# Patient Record
Sex: MALE | Race: BLACK OR AFRICAN AMERICAN | NOT HISPANIC OR LATINO | Employment: FULL TIME | URBAN - NONMETROPOLITAN AREA
[De-identification: names, ages, dates, MRNs, and addresses within clinical notes are randomized per-mention and may not be internally consistent; named-entity substitution may affect disease eponyms.]

---
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
## 2023-03-21 PROBLEM — N18.30 STAGE 3 CHRONIC KIDNEY DISEASE (MULTI): Status: ACTIVE | Noted: 2023-03-21

## 2023-03-21 PROBLEM — E66.811 CLASS 1 OBESITY WITH BODY MASS INDEX (BMI) OF 32.0 TO 32.9 IN ADULT: Status: ACTIVE | Noted: 2023-03-21

## 2023-03-21 PROBLEM — T14.8XXA MUSCLE STRAIN: Status: ACTIVE | Noted: 2023-03-21

## 2023-03-21 PROBLEM — E78.00 HYPERCHOLESTEROLEMIA: Status: ACTIVE | Noted: 2023-03-21

## 2023-03-21 PROBLEM — S22.49XA RIB FRACTURES: Status: ACTIVE | Noted: 2023-03-21

## 2023-03-21 PROBLEM — E87.6 HYPOKALEMIA: Status: ACTIVE | Noted: 2023-03-21

## 2023-03-21 PROBLEM — R73.9 HYPERGLYCEMIA: Status: ACTIVE | Noted: 2023-03-21

## 2023-03-21 PROBLEM — I10 BENIGN HYPERTENSION: Status: ACTIVE | Noted: 2023-03-21

## 2023-03-21 PROBLEM — J94.2 HEMOTHORAX: Status: ACTIVE | Noted: 2023-03-21

## 2023-03-21 PROBLEM — B02.9 SHINGLES: Status: ACTIVE | Noted: 2023-03-21

## 2023-03-21 PROBLEM — R07.81 RIB PAIN: Status: ACTIVE | Noted: 2023-03-21

## 2023-03-21 PROBLEM — R06.02 SHORTNESS OF BREATH: Status: ACTIVE | Noted: 2023-03-21

## 2023-03-21 PROBLEM — K21.9 ESOPHAGEAL REFLUX: Status: ACTIVE | Noted: 2023-03-21

## 2023-03-21 PROBLEM — L01.02 PUSTULAR FOLLICULITIS: Status: ACTIVE | Noted: 2023-03-21

## 2023-03-21 PROBLEM — M77.12 LATERAL EPICONDYLITIS OF LEFT ELBOW: Status: ACTIVE | Noted: 2023-03-21

## 2023-03-21 PROBLEM — E78.5 HYPERLIPIDEMIA: Status: ACTIVE | Noted: 2023-03-21

## 2023-03-21 PROBLEM — R94.31 ABNORMAL EKG: Status: ACTIVE | Noted: 2023-03-21

## 2023-03-21 PROBLEM — E66.9 CLASS 1 OBESITY WITH BODY MASS INDEX (BMI) OF 32.0 TO 32.9 IN ADULT: Status: ACTIVE | Noted: 2023-03-21

## 2023-03-21 RX ORDER — ROSUVASTATIN CALCIUM 10 MG/1
1 TABLET, COATED ORAL DAILY
COMMUNITY
Start: 2013-07-19 | End: 2023-03-31 | Stop reason: SDUPTHER

## 2023-03-21 RX ORDER — DILTIAZEM HYDROCHLORIDE 420 MG/1
1 CAPSULE, EXTENDED RELEASE ORAL DAILY
COMMUNITY
Start: 2016-07-01 | End: 2023-03-31 | Stop reason: SDUPTHER

## 2023-03-21 RX ORDER — POTASSIUM CHLORIDE 20 MEQ/1
1 TABLET, EXTENDED RELEASE ORAL 3 TIMES DAILY
COMMUNITY
Start: 2013-07-19 | End: 2023-03-31 | Stop reason: SDUPTHER

## 2023-03-21 RX ORDER — ESOMEPRAZOLE MAGNESIUM 40 MG/1
1 CAPSULE, DELAYED RELEASE ORAL DAILY
COMMUNITY
Start: 2019-02-14 | End: 2023-03-31 | Stop reason: SDUPTHER

## 2023-03-21 RX ORDER — TRIAMTERENE/HYDROCHLOROTHIAZID 37.5-25 MG
1 TABLET ORAL DAILY
COMMUNITY
Start: 2013-04-17 | End: 2023-03-31 | Stop reason: SDUPTHER

## 2023-03-21 RX ORDER — LOSARTAN POTASSIUM AND HYDROCHLOROTHIAZIDE 25; 100 MG/1; MG/1
1 TABLET ORAL DAILY
COMMUNITY
Start: 2013-07-19 | End: 2023-03-31 | Stop reason: SDUPTHER

## 2023-03-21 RX ORDER — CARVEDILOL 25 MG/1
25 TABLET ORAL
COMMUNITY
End: 2023-03-31 | Stop reason: SDUPTHER

## 2023-03-24 ENCOUNTER — OFFICE VISIT (OUTPATIENT)
Dept: PRIMARY CARE | Facility: CLINIC | Age: 58
End: 2023-03-24
Payer: COMMERCIAL

## 2023-03-24 VITALS
HEART RATE: 74 BPM | DIASTOLIC BLOOD PRESSURE: 80 MMHG | BODY MASS INDEX: 33 KG/M2 | OXYGEN SATURATION: 95 % | WEIGHT: 222.8 LBS | HEIGHT: 69 IN | RESPIRATION RATE: 18 BRPM | SYSTOLIC BLOOD PRESSURE: 130 MMHG

## 2023-03-24 DIAGNOSIS — E87.6 HYPOKALEMIA: ICD-10-CM

## 2023-03-24 DIAGNOSIS — K21.9 GASTROESOPHAGEAL REFLUX DISEASE, UNSPECIFIED WHETHER ESOPHAGITIS PRESENT: ICD-10-CM

## 2023-03-24 DIAGNOSIS — I10 BENIGN HYPERTENSION: ICD-10-CM

## 2023-03-24 DIAGNOSIS — E78.00 HYPERCHOLESTEROLEMIA: ICD-10-CM

## 2023-03-24 PROCEDURE — 3075F SYST BP GE 130 - 139MM HG: CPT | Performed by: INTERNAL MEDICINE

## 2023-03-24 PROCEDURE — 3079F DIAST BP 80-89 MM HG: CPT | Performed by: INTERNAL MEDICINE

## 2023-03-24 PROCEDURE — 99212 OFFICE O/P EST SF 10 MIN: CPT | Performed by: INTERNAL MEDICINE

## 2023-03-24 PROCEDURE — 1036F TOBACCO NON-USER: CPT | Performed by: INTERNAL MEDICINE

## 2023-03-24 ASSESSMENT — ENCOUNTER SYMPTOMS
CONSTITUTIONAL NEGATIVE: 1
HYPERTENSION: 1

## 2023-03-24 ASSESSMENT — PAIN SCALES - GENERAL: PAINLEVEL: 0-NO PAIN

## 2023-03-24 NOTE — PROGRESS NOTES
Subjective   Patient ID:   Bairon Quigley is a 57 y.o. male who presents to the office today for Follow-up, Hyperlipidemia, and Hypertension.    Hyperlipidemia  This is a chronic problem. The current episode started more than 1 year ago. The problem is controlled. Recent lipid tests were reviewed and are normal. Exacerbating diseases include chronic renal disease. Current antihyperlipidemic treatment includes statins. The current treatment provides significant improvement of lipids. Risk factors for coronary artery disease include hypertension.   Hypertension  This is a chronic problem. The current episode started more than 1 year ago. The problem has been gradually improving since onset. The problem is controlled. There are no associated agents to hypertension. Risk factors for coronary artery disease include stress and dyslipidemia. Past treatments include lifestyle changes, beta blockers, angiotensin blockers, diuretics and calcium channel blockers. The current treatment provides moderate improvement. There are no compliance problems.  Identifiable causes of hypertension include chronic renal disease.     Reports that he is feeling well. No issues with bowel or bladder. No chest pain, N/V/D/C/CP. Working out every day. No LANDAVERDE. Sleeping, eating and drinking ok.     Review of Systems   Constitutional: Negative.    All other systems reviewed and are negative.    12 point review of systems negative unless stated above in HPI    Vitals:    03/24/23 1326   BP: 130/80   Pulse: 74   Resp: 18   SpO2: 95%       Physical Exam  Vitals reviewed.   Constitutional:       General: He is not in acute distress.     Appearance: Normal appearance. He is not ill-appearing.   HENT:      Head: Normocephalic and atraumatic.      Right Ear: Tympanic membrane and external ear normal.      Left Ear: Tympanic membrane and external ear normal.      Nose: Nose normal.      Mouth/Throat:      Mouth: Mucous membranes are moist.      Pharynx:  Oropharynx is clear.   Eyes:      Conjunctiva/sclera: Conjunctivae normal.      Pupils: Pupils are equal, round, and reactive to light.   Cardiovascular:      Rate and Rhythm: Normal rate and regular rhythm.      Heart sounds: Normal heart sounds. No murmur heard.  Pulmonary:      Effort: Pulmonary effort is normal. No respiratory distress.      Breath sounds: Normal breath sounds. No wheezing.   Abdominal:      General: There is no distension.      Palpations: Abdomen is soft. There is no mass.      Tenderness: There is no abdominal tenderness.   Musculoskeletal:         General: Normal range of motion.      Cervical back: Normal range of motion and neck supple.   Skin:     General: Skin is warm and dry.   Neurological:      General: No focal deficit present.      Mental Status: He is alert and oriented to person, place, and time.      Sensory: No sensory deficit.      Motor: No weakness.      Coordination: Coordination normal.      Gait: Gait normal.   Psychiatric:         Mood and Affect: Mood normal.         Behavior: Behavior normal.       Current Outpatient Medications on File Prior to Visit   Medication Sig Dispense Refill    carvedilol (Coreg) 25 mg tablet Take 1 tablet (25 mg) by mouth in the morning and 1 tablet (25 mg) in the evening. Take with meals.      dilTIAZem ER (Tiazac) 420 mg 24 hr capsule Take 1 capsule (420 mg) by mouth once daily.      esomeprazole (NexIUM) 40 mg DR capsule Take 1 capsule (40 mg) by mouth once daily.      losartan-hydrochlorothiazide (Hyzaar) 100-25 mg tablet Take 1 tablet by mouth once daily.      potassium chloride CR (Klor-Con M20) 20 mEq ER tablet Take 1 tablet (20 mEq) by mouth in the morning and 1 tablet (20 mEq) in the evening and 1 tablet (20 mEq) before bedtime.      rosuvastatin (Crestor) 10 mg tablet Take 1 tablet (10 mg) by mouth once daily.      triamterene-hydrochlorothiazid (Maxzide-25) 37.5-25 mg tablet Take 1 tablet by mouth once daily.       No current  facility-administered medications on file prior to visit.      Assessment/Plan   Diagnoses and all orders for this visit:  Benign hypertension  Comments:  - c/w current medications  Hypokalemia  Comments:  - c/w kcL BID  - will recheck labs at next visit  Hypercholesterolemia  Comments:  - reviewed lipid panel from 7/2022  - recheck labs at next visit  - c/w statin  Gastroesophageal reflux disease, unspecified whether esophagitis present  Comments:  c/w PPI    ------  Written by Lisa Lloyd LPN, acting as a scribe for Dr. Henry. This note accurately reflects the work and decisions made by Dr. Henry.     I, Dr. Henry, attest all medical record entries made by the scribe were under my direction and were personally dictated by me. I have reviewed the chart and agree that the record accurately reflects my performance of the history, physical exam, and assessment and plan.

## 2023-03-31 DIAGNOSIS — K21.9 GASTROESOPHAGEAL REFLUX DISEASE WITHOUT ESOPHAGITIS: Primary | ICD-10-CM

## 2023-03-31 DIAGNOSIS — I10 BENIGN HYPERTENSION: ICD-10-CM

## 2023-03-31 DIAGNOSIS — E78.00 HYPERCHOLESTEROLEMIA: ICD-10-CM

## 2023-03-31 DIAGNOSIS — E87.6 HYPOKALEMIA: ICD-10-CM

## 2023-03-31 RX ORDER — ESOMEPRAZOLE MAGNESIUM 40 MG/1
40 CAPSULE, DELAYED RELEASE ORAL DAILY
Qty: 90 CAPSULE | Refills: 1 | Status: SHIPPED | OUTPATIENT
Start: 2023-03-31 | End: 2023-07-07 | Stop reason: SDUPTHER

## 2023-03-31 RX ORDER — CARVEDILOL 25 MG/1
25 TABLET ORAL
Qty: 180 TABLET | Refills: 1 | Status: SHIPPED | OUTPATIENT
Start: 2023-03-31 | End: 2023-07-07 | Stop reason: SDUPTHER

## 2023-03-31 RX ORDER — ROSUVASTATIN CALCIUM 10 MG/1
10 TABLET, COATED ORAL DAILY
Qty: 90 TABLET | Refills: 1 | Status: SHIPPED | OUTPATIENT
Start: 2023-03-31 | End: 2023-07-07 | Stop reason: SDUPTHER

## 2023-03-31 RX ORDER — POTASSIUM CHLORIDE 20 MEQ/1
20 TABLET, EXTENDED RELEASE ORAL 3 TIMES DAILY
Qty: 270 TABLET | Refills: 1 | Status: SHIPPED | OUTPATIENT
Start: 2023-03-31 | End: 2023-07-07 | Stop reason: SDUPTHER

## 2023-03-31 RX ORDER — DILTIAZEM HYDROCHLORIDE 420 MG/1
420 CAPSULE, EXTENDED RELEASE ORAL DAILY
Qty: 90 CAPSULE | Refills: 1 | Status: SHIPPED | OUTPATIENT
Start: 2023-03-31 | End: 2023-07-07 | Stop reason: SDUPTHER

## 2023-03-31 RX ORDER — TRIAMTERENE/HYDROCHLOROTHIAZID 37.5-25 MG
1 TABLET ORAL DAILY
Qty: 90 TABLET | Refills: 1 | Status: SHIPPED | OUTPATIENT
Start: 2023-03-31 | End: 2023-07-07 | Stop reason: SDUPTHER

## 2023-03-31 RX ORDER — LOSARTAN POTASSIUM AND HYDROCHLOROTHIAZIDE 25; 100 MG/1; MG/1
1 TABLET ORAL DAILY
Qty: 90 TABLET | Refills: 1 | Status: SHIPPED | OUTPATIENT
Start: 2023-03-31 | End: 2023-07-07 | Stop reason: SDUPTHER

## 2023-07-07 DIAGNOSIS — E78.00 HYPERCHOLESTEROLEMIA: ICD-10-CM

## 2023-07-07 DIAGNOSIS — I10 BENIGN HYPERTENSION: ICD-10-CM

## 2023-07-07 DIAGNOSIS — K21.9 GASTROESOPHAGEAL REFLUX DISEASE WITHOUT ESOPHAGITIS: ICD-10-CM

## 2023-07-07 DIAGNOSIS — E87.6 HYPOKALEMIA: ICD-10-CM

## 2023-07-10 RX ORDER — ROSUVASTATIN CALCIUM 10 MG/1
10 TABLET, COATED ORAL DAILY
Qty: 90 TABLET | Refills: 0 | Status: SHIPPED | OUTPATIENT
Start: 2023-07-10 | End: 2023-10-13 | Stop reason: SDUPTHER

## 2023-07-10 RX ORDER — ESOMEPRAZOLE MAGNESIUM 40 MG/1
40 CAPSULE, DELAYED RELEASE ORAL DAILY
Qty: 90 CAPSULE | Refills: 0 | Status: SHIPPED | OUTPATIENT
Start: 2023-07-10 | End: 2023-10-13 | Stop reason: SDUPTHER

## 2023-07-10 RX ORDER — POTASSIUM CHLORIDE 20 MEQ/1
20 TABLET, EXTENDED RELEASE ORAL 3 TIMES DAILY
Qty: 270 TABLET | Refills: 0 | Status: SHIPPED | OUTPATIENT
Start: 2023-07-10 | End: 2023-10-13 | Stop reason: SDUPTHER

## 2023-07-10 RX ORDER — LOSARTAN POTASSIUM AND HYDROCHLOROTHIAZIDE 25; 100 MG/1; MG/1
1 TABLET ORAL DAILY
Qty: 90 TABLET | Refills: 0 | Status: SHIPPED | OUTPATIENT
Start: 2023-07-10 | End: 2023-10-13 | Stop reason: SDUPTHER

## 2023-07-10 RX ORDER — TRIAMTERENE/HYDROCHLOROTHIAZID 37.5-25 MG
1 TABLET ORAL DAILY
Qty: 90 TABLET | Refills: 0 | Status: SHIPPED | OUTPATIENT
Start: 2023-07-10 | End: 2023-10-13 | Stop reason: SDUPTHER

## 2023-07-10 RX ORDER — DILTIAZEM HYDROCHLORIDE 420 MG/1
420 CAPSULE, EXTENDED RELEASE ORAL DAILY
Qty: 90 CAPSULE | Refills: 0 | Status: SHIPPED | OUTPATIENT
Start: 2023-07-10 | End: 2023-10-13 | Stop reason: SDUPTHER

## 2023-07-10 RX ORDER — CARVEDILOL 25 MG/1
25 TABLET ORAL
Qty: 180 TABLET | Refills: 0 | Status: SHIPPED | OUTPATIENT
Start: 2023-07-10 | End: 2023-10-13 | Stop reason: SDUPTHER

## 2023-09-06 ENCOUNTER — OFFICE VISIT (OUTPATIENT)
Dept: PRIMARY CARE | Facility: CLINIC | Age: 58
End: 2023-09-06
Payer: COMMERCIAL

## 2023-09-06 ENCOUNTER — LAB (OUTPATIENT)
Dept: LAB | Facility: LAB | Age: 58
End: 2023-09-06
Payer: COMMERCIAL

## 2023-09-06 VITALS
OXYGEN SATURATION: 94 % | HEART RATE: 77 BPM | SYSTOLIC BLOOD PRESSURE: 144 MMHG | HEIGHT: 69 IN | WEIGHT: 218.6 LBS | BODY MASS INDEX: 32.38 KG/M2 | RESPIRATION RATE: 18 BRPM | DIASTOLIC BLOOD PRESSURE: 97 MMHG

## 2023-09-06 DIAGNOSIS — I10 HYPERTENSION, UNSPECIFIED TYPE: ICD-10-CM

## 2023-09-06 DIAGNOSIS — E78.00 HYPERCHOLESTEROLEMIA: ICD-10-CM

## 2023-09-06 DIAGNOSIS — E87.6 HYPOKALEMIA: ICD-10-CM

## 2023-09-06 DIAGNOSIS — K21.9 GASTROESOPHAGEAL REFLUX DISEASE, UNSPECIFIED WHETHER ESOPHAGITIS PRESENT: ICD-10-CM

## 2023-09-06 DIAGNOSIS — E78.5 HYPERLIPIDEMIA, UNSPECIFIED HYPERLIPIDEMIA TYPE: ICD-10-CM

## 2023-09-06 LAB
ALANINE AMINOTRANSFERASE (SGPT) (U/L) IN SER/PLAS: 18 U/L (ref 10–52)
ALBUMIN (G/DL) IN SER/PLAS: 4.4 G/DL (ref 3.4–5)
ALKALINE PHOSPHATASE (U/L) IN SER/PLAS: 51 U/L (ref 33–120)
ANION GAP IN SER/PLAS: 9 MMOL/L (ref 10–20)
ASPARTATE AMINOTRANSFERASE (SGOT) (U/L) IN SER/PLAS: 28 U/L (ref 9–39)
BASOPHILS (10*3/UL) IN BLOOD BY AUTOMATED COUNT: 0.06 X10E9/L (ref 0–0.1)
BASOPHILS/100 LEUKOCYTES IN BLOOD BY AUTOMATED COUNT: 0.8 % (ref 0–2)
BILIRUBIN TOTAL (MG/DL) IN SER/PLAS: 0.7 MG/DL (ref 0–1.2)
CALCIUM (MG/DL) IN SER/PLAS: 9.1 MG/DL (ref 8.6–10.3)
CARBON DIOXIDE, TOTAL (MMOL/L) IN SER/PLAS: 28 MMOL/L (ref 21–32)
CHLORIDE (MMOL/L) IN SER/PLAS: 106 MMOL/L (ref 98–107)
CHOLESTEROL (MG/DL) IN SER/PLAS: 150 MG/DL (ref 0–199)
CHOLESTEROL IN HDL (MG/DL) IN SER/PLAS: 52.4 MG/DL
CHOLESTEROL/HDL RATIO: 2.9
CREATININE (MG/DL) IN SER/PLAS: 1.15 MG/DL (ref 0.5–1.3)
EOSINOPHILS (10*3/UL) IN BLOOD BY AUTOMATED COUNT: 0.75 X10E9/L (ref 0–0.7)
EOSINOPHILS/100 LEUKOCYTES IN BLOOD BY AUTOMATED COUNT: 9.6 % (ref 0–6)
ERYTHROCYTE DISTRIBUTION WIDTH (RATIO) BY AUTOMATED COUNT: 14 % (ref 11.5–14.5)
ERYTHROCYTE MEAN CORPUSCULAR HEMOGLOBIN CONCENTRATION (G/DL) BY AUTOMATED: 34.4 G/DL (ref 32–36)
ERYTHROCYTE MEAN CORPUSCULAR VOLUME (FL) BY AUTOMATED COUNT: 88 FL (ref 80–100)
ERYTHROCYTES (10*6/UL) IN BLOOD BY AUTOMATED COUNT: 5.2 X10E12/L (ref 4.5–5.9)
GFR MALE: 74 ML/MIN/1.73M2
GLUCOSE (MG/DL) IN SER/PLAS: 95 MG/DL (ref 74–99)
HEMATOCRIT (%) IN BLOOD BY AUTOMATED COUNT: 45.9 % (ref 41–52)
HEMOGLOBIN (G/DL) IN BLOOD: 15.8 G/DL (ref 13.5–17.5)
IMMATURE GRANULOCYTES/100 LEUKOCYTES IN BLOOD BY AUTOMATED COUNT: 0.3 % (ref 0–0.9)
LDL: 69 MG/DL (ref 0–99)
LEUKOCYTES (10*3/UL) IN BLOOD BY AUTOMATED COUNT: 7.8 X10E9/L (ref 4.4–11.3)
LYMPHOCYTES (10*3/UL) IN BLOOD BY AUTOMATED COUNT: 1.83 X10E9/L (ref 1.2–4.8)
LYMPHOCYTES/100 LEUKOCYTES IN BLOOD BY AUTOMATED COUNT: 23.5 % (ref 13–44)
MONOCYTES (10*3/UL) IN BLOOD BY AUTOMATED COUNT: 0.62 X10E9/L (ref 0.1–1)
MONOCYTES/100 LEUKOCYTES IN BLOOD BY AUTOMATED COUNT: 7.9 % (ref 2–10)
NEUTROPHILS (10*3/UL) IN BLOOD BY AUTOMATED COUNT: 4.52 X10E9/L (ref 1.2–7.7)
NEUTROPHILS/100 LEUKOCYTES IN BLOOD BY AUTOMATED COUNT: 57.9 % (ref 40–80)
PLATELETS (10*3/UL) IN BLOOD AUTOMATED COUNT: 297 X10E9/L (ref 150–450)
POTASSIUM (MMOL/L) IN SER/PLAS: 3.2 MMOL/L (ref 3.5–5.3)
PROTEIN TOTAL: 7 G/DL (ref 6.4–8.2)
SODIUM (MMOL/L) IN SER/PLAS: 140 MMOL/L (ref 136–145)
THYROXINE (T4) FREE (NG/DL) IN SER/PLAS: 0.64 NG/DL (ref 0.61–1.12)
TRIGLYCERIDE (MG/DL) IN SER/PLAS: 141 MG/DL (ref 0–149)
UREA NITROGEN (MG/DL) IN SER/PLAS: 13 MG/DL (ref 6–23)
VLDL: 28 MG/DL (ref 0–40)

## 2023-09-06 PROCEDURE — 3077F SYST BP >= 140 MM HG: CPT | Performed by: INTERNAL MEDICINE

## 2023-09-06 PROCEDURE — 1036F TOBACCO NON-USER: CPT | Performed by: INTERNAL MEDICINE

## 2023-09-06 PROCEDURE — 36415 COLL VENOUS BLD VENIPUNCTURE: CPT

## 2023-09-06 PROCEDURE — 84443 ASSAY THYROID STIM HORMONE: CPT

## 2023-09-06 PROCEDURE — 85025 COMPLETE CBC W/AUTO DIFF WBC: CPT

## 2023-09-06 PROCEDURE — 84439 ASSAY OF FREE THYROXINE: CPT

## 2023-09-06 PROCEDURE — 3080F DIAST BP >= 90 MM HG: CPT | Performed by: INTERNAL MEDICINE

## 2023-09-06 PROCEDURE — 80053 COMPREHEN METABOLIC PANEL: CPT

## 2023-09-06 PROCEDURE — 99213 OFFICE O/P EST LOW 20 MIN: CPT | Performed by: INTERNAL MEDICINE

## 2023-09-06 PROCEDURE — 84153 ASSAY OF PSA TOTAL: CPT

## 2023-09-06 PROCEDURE — 80061 LIPID PANEL: CPT

## 2023-09-06 RX ORDER — TERAZOSIN 1 MG/1
1 CAPSULE ORAL 2 TIMES DAILY
Qty: 60 CAPSULE | Refills: 5 | Status: SHIPPED | OUTPATIENT
Start: 2023-09-06 | End: 2023-10-13 | Stop reason: SDUPTHER

## 2023-09-06 ASSESSMENT — PATIENT HEALTH QUESTIONNAIRE - PHQ9
1. LITTLE INTEREST OR PLEASURE IN DOING THINGS: NOT AT ALL
SUM OF ALL RESPONSES TO PHQ9 QUESTIONS 1 AND 2: 0
2. FEELING DOWN, DEPRESSED OR HOPELESS: NOT AT ALL

## 2023-09-06 ASSESSMENT — PAIN SCALES - GENERAL: PAINLEVEL: 0-NO PAIN

## 2023-09-06 NOTE — PROGRESS NOTES
He states he feels really good, no new issues or complaints. He denies any constipation, diarrhea or issues with urination. He states that he is very active. He states he uses push mower to mow several lawns. He denies any chest pain or SOB. He denies any lower extremity edema. He denies any dizziness or lightheadedness. He states he only wakes up once at night to urinate.     Patient ID: Bairon Quigley is a 58 y.o. male with PMH remarkable for HLD, HTN who presents to the office today for follow up. He was last seen in the office on 3/24/23 for check up.     HEALTH MAINTENANCE: Follow Up  Smoking: never smoker  Labs: 7/7/22, WNL  PSA: DUE  Colonoscopy (45-75): 7/19/18  Lung cancer screening (55-80 + 30 pack year + smoking/quit in last 15 years): n/a    SOCIAL HISTORY:  Social History     Tobacco Use    Smoking status: Never    Smokeless tobacco: Former   Substance Use Topics    Alcohol use: Never    Drug use: Never     IMMUNIZATIONS:  Immunization History   Administered Date(s) Administered    Pfizer Purple Cap SARS-CoV-2 03/20/2021, 04/10/2021, 01/03/2022     REVIEW OF SYSTEMS:  Review of Systems   Constitutional: Negative.    Respiratory: Negative.     Cardiovascular: Negative.    Gastrointestinal: Negative.    Genitourinary: Negative.    Musculoskeletal: Negative.    Neurological: Negative.      ALLERGIES:  Allergies   Allergen Reactions    Influenza Virus Vaccine Whole Unknown      VITAL SIGNS:  Vitals:    09/06/23 1054   BP: (!) 144/97   Pulse: 77   Resp: 18   SpO2: 94%     PHYSICAL EXAM:  Physical Exam  Vitals reviewed.   Constitutional:       Appearance: Normal appearance.   HENT:      Head: Normocephalic and atraumatic.   Eyes:      Extraocular Movements: Extraocular movements intact.      Pupils: Pupils are equal, round, and reactive to light.   Cardiovascular:      Rate and Rhythm: Normal rate and regular rhythm.      Pulses: Normal pulses.      Heart sounds: Normal heart sounds.   Pulmonary:       Effort: Pulmonary effort is normal.      Breath sounds: Normal breath sounds.   Abdominal:      General: Bowel sounds are normal.      Palpations: Abdomen is soft.   Musculoskeletal:         General: Normal range of motion.      Cervical back: Normal range of motion and neck supple.   Skin:     General: Skin is warm and dry.   Neurological:      General: No focal deficit present.      Mental Status: He is alert and oriented to person, place, and time.   Psychiatric:         Mood and Affect: Mood normal.         Behavior: Behavior normal.       MEDICATIONS:  Current Outpatient Medications on File Prior to Visit   Medication Sig Dispense Refill    carvedilol (Coreg) 25 mg tablet Take 1 tablet (25 mg) by mouth 2 times a day with meals. 180 tablet 0    dilTIAZem ER (Tiazac) 420 mg 24 hr capsule Take 1 capsule (420 mg) by mouth once daily. 90 capsule 0    esomeprazole (NexIUM) 40 mg DR capsule Take 1 capsule (40 mg) by mouth once daily. 90 capsule 0    losartan-hydrochlorothiazide (Hyzaar) 100-25 mg tablet Take 1 tablet by mouth once daily. 90 tablet 0    potassium chloride CR (Klor-Con M20) 20 mEq ER tablet Take 1 tablet (20 mEq) by mouth 3 times a day. 270 tablet 0    rosuvastatin (Crestor) 10 mg tablet Take 1 tablet (10 mg) by mouth once daily. 90 tablet 0    triamterene-hydrochlorothiazid (Maxzide-25) 37.5-25 mg tablet Take 1 tablet by mouth once daily. 90 tablet 0     No current facility-administered medications on file prior to visit.      LABORATORY DATA:  Lab Results   Component Value Date    WBC 9.0 07/07/2022    HGB 15.9 07/07/2022    HCT 47.5 07/07/2022     07/07/2022    CHOL 168 07/07/2022    TRIG 129 07/07/2022    HDL 61.0 07/07/2022    ALT 26 07/07/2022    AST 32 07/07/2022     07/07/2022    K 3.4 (L) 07/07/2022     07/07/2022    CREATININE 1.22 07/07/2022    BUN 16 07/07/2022    CO2 26 07/07/2022    TSH 1.20 07/07/2022    INR 1.1 11/19/2021    HGBA1C 5.5 06/12/2020     ASSESSMENT AND  PLAN:  Health Maintenance: Four month check up  - he is due for annual fasting bloodwork- orders inputted  - he states he has been feeling good, has been getting plenty of exercise pushing a lawnmower  - he states he received call from Dr. Monreal's office to schedule colonoscopy, advised to schedule this  - Follow up in four months    HTN  - his BP is elevated today, 144/97  - he reports it is consistently elevated at home as well  - continue with carvedilol, losartan-hydrochlorothiazide, triamterene-hydrochlorothiazide, Diltiazem  - we will add terazosin 1mg twice daily  - advised to monitor BP at home 2-3 times per week and call us with results after 3 weeks    Hypokalemia  - continue with potassium supplement  - will check CMP    Hypercholesteremia  - continue with rosuvastatin  - will check lipid panel    GERD  - continue with PPI  --------------------  Written by Lilo Lyon LPN, acting as a scribe for Dr. Henry. This note accurately reflects the work and decisions made by Dr. Henry.     I, Dr. Henyr, attest all medical record entries made by the scribe were under my direction and were personally dictated by me. I have reviewed the chart and agree that the record accurately reflects my performance of the history, physical exam, and assessment and plan.

## 2023-09-07 LAB
PROSTATE SPECIFIC ANTIGEN,SCREEN: 0.74 NG/ML (ref 0–4)
THYROTROPIN (MIU/L) IN SER/PLAS BY DETECTION LIMIT <= 0.05 MIU/L: 0.87 MIU/L (ref 0.44–3.98)

## 2023-09-07 ASSESSMENT — ENCOUNTER SYMPTOMS
MUSCULOSKELETAL NEGATIVE: 1
GASTROINTESTINAL NEGATIVE: 1
NEUROLOGICAL NEGATIVE: 1
RESPIRATORY NEGATIVE: 1
CONSTITUTIONAL NEGATIVE: 1
CARDIOVASCULAR NEGATIVE: 1

## 2023-09-07 NOTE — PATIENT INSTRUCTIONS
It was great to see you in the office today! Here is what we discussed at your visit today:  Please continue to take your current medications   We will add another medication for your blood pressure called Terazosin, take as directed  Monitor your BP at home 2-3 times per week and call us with results after 3 weeks  Please get bloodwork drawn as soon as you are able. We will call you with results.  Follow up in four months

## 2023-09-08 DIAGNOSIS — I10 HYPERTENSION, UNSPECIFIED TYPE: ICD-10-CM

## 2023-10-13 DIAGNOSIS — E78.00 HYPERCHOLESTEROLEMIA: ICD-10-CM

## 2023-10-13 DIAGNOSIS — I10 BENIGN HYPERTENSION: ICD-10-CM

## 2023-10-13 DIAGNOSIS — I10 HYPERTENSION, UNSPECIFIED TYPE: ICD-10-CM

## 2023-10-13 DIAGNOSIS — K21.9 GASTROESOPHAGEAL REFLUX DISEASE WITHOUT ESOPHAGITIS: ICD-10-CM

## 2023-10-13 DIAGNOSIS — E87.6 HYPOKALEMIA: ICD-10-CM

## 2023-10-17 RX ORDER — ESOMEPRAZOLE MAGNESIUM 40 MG/1
40 CAPSULE, DELAYED RELEASE ORAL DAILY
Qty: 90 CAPSULE | Refills: 0 | Status: SHIPPED | OUTPATIENT
Start: 2023-10-17 | End: 2024-01-29

## 2023-10-17 RX ORDER — TERAZOSIN 1 MG/1
1 CAPSULE ORAL 2 TIMES DAILY
Qty: 180 CAPSULE | Refills: 0 | Status: SHIPPED | OUTPATIENT
Start: 2023-10-17 | End: 2024-02-16 | Stop reason: ALTCHOICE

## 2023-10-17 RX ORDER — POTASSIUM CHLORIDE 20 MEQ/1
20 TABLET, EXTENDED RELEASE ORAL 3 TIMES DAILY
Qty: 270 TABLET | Refills: 0 | Status: SHIPPED | OUTPATIENT
Start: 2023-10-17 | End: 2024-01-29

## 2023-10-17 RX ORDER — TRIAMTERENE/HYDROCHLOROTHIAZID 37.5-25 MG
1 TABLET ORAL DAILY
Qty: 90 TABLET | Refills: 0 | Status: SHIPPED | OUTPATIENT
Start: 2023-10-17 | End: 2024-01-29

## 2023-10-17 RX ORDER — ROSUVASTATIN CALCIUM 10 MG/1
10 TABLET, COATED ORAL DAILY
Qty: 90 TABLET | Refills: 0 | Status: SHIPPED | OUTPATIENT
Start: 2023-10-17 | End: 2024-01-29

## 2023-10-17 RX ORDER — CARVEDILOL 25 MG/1
25 TABLET ORAL
Qty: 180 TABLET | Refills: 0 | Status: SHIPPED | OUTPATIENT
Start: 2023-10-17 | End: 2024-01-29

## 2023-10-17 RX ORDER — LOSARTAN POTASSIUM AND HYDROCHLOROTHIAZIDE 25; 100 MG/1; MG/1
1 TABLET ORAL DAILY
Qty: 90 TABLET | Refills: 0 | Status: SHIPPED | OUTPATIENT
Start: 2023-10-17 | End: 2024-01-29

## 2023-10-17 RX ORDER — DILTIAZEM HYDROCHLORIDE 420 MG/1
420 CAPSULE, EXTENDED RELEASE ORAL DAILY
Qty: 90 CAPSULE | Refills: 0 | Status: SHIPPED | OUTPATIENT
Start: 2023-10-17 | End: 2024-05-13

## 2023-12-01 ENCOUNTER — ANCILLARY PROCEDURE (OUTPATIENT)
Dept: RADIOLOGY | Facility: CLINIC | Age: 58
End: 2023-12-01
Payer: COMMERCIAL

## 2023-12-01 DIAGNOSIS — S69.90XA FINGER INJURY, INITIAL ENCOUNTER: ICD-10-CM

## 2023-12-01 PROCEDURE — 73140 X-RAY EXAM OF FINGER(S): CPT | Mod: LT,FY

## 2023-12-01 PROCEDURE — 73140 X-RAY EXAM OF FINGER(S): CPT | Mod: LEFT SIDE | Performed by: RADIOLOGY

## 2024-02-16 ENCOUNTER — OFFICE VISIT (OUTPATIENT)
Dept: PRIMARY CARE | Facility: CLINIC | Age: 59
End: 2024-02-16
Payer: COMMERCIAL

## 2024-02-16 VITALS
SYSTOLIC BLOOD PRESSURE: 142 MMHG | HEART RATE: 75 BPM | DIASTOLIC BLOOD PRESSURE: 96 MMHG | BODY MASS INDEX: 32.14 KG/M2 | HEIGHT: 69 IN | WEIGHT: 217 LBS | RESPIRATION RATE: 18 BRPM | OXYGEN SATURATION: 96 %

## 2024-02-16 DIAGNOSIS — I10 BENIGN HYPERTENSION: ICD-10-CM

## 2024-02-16 DIAGNOSIS — E78.5 HYPERLIPIDEMIA, UNSPECIFIED HYPERLIPIDEMIA TYPE: ICD-10-CM

## 2024-02-16 DIAGNOSIS — Z00.00 HEALTHCARE MAINTENANCE: ICD-10-CM

## 2024-02-16 DIAGNOSIS — I15.8 OTHER SECONDARY HYPERTENSION: ICD-10-CM

## 2024-02-16 DIAGNOSIS — N18.30 STAGE 3 CHRONIC KIDNEY DISEASE, UNSPECIFIED WHETHER STAGE 3A OR 3B CKD (MULTI): ICD-10-CM

## 2024-02-16 PROCEDURE — 1036F TOBACCO NON-USER: CPT | Performed by: INTERNAL MEDICINE

## 2024-02-16 PROCEDURE — 3077F SYST BP >= 140 MM HG: CPT | Performed by: INTERNAL MEDICINE

## 2024-02-16 PROCEDURE — 3080F DIAST BP >= 90 MM HG: CPT | Performed by: INTERNAL MEDICINE

## 2024-02-16 PROCEDURE — 99213 OFFICE O/P EST LOW 20 MIN: CPT | Performed by: INTERNAL MEDICINE

## 2024-02-16 RX ORDER — DILTIAZEM HYDROCHLORIDE 120 MG/1
120 CAPSULE, EXTENDED RELEASE ORAL DAILY
Qty: 90 CAPSULE | Refills: 1 | Status: SHIPPED | OUTPATIENT
Start: 2024-02-16 | End: 2024-05-21 | Stop reason: SDUPTHER

## 2024-02-16 RX ORDER — DILTIAZEM HYDROCHLORIDE 120 MG/1
120 CAPSULE, COATED, EXTENDED RELEASE ORAL DAILY
Qty: 14 CAPSULE | Refills: 0 | Status: SHIPPED | OUTPATIENT
Start: 2024-02-16 | End: 2024-03-01

## 2024-02-16 ASSESSMENT — PATIENT HEALTH QUESTIONNAIRE - PHQ9
2. FEELING DOWN, DEPRESSED OR HOPELESS: NOT AT ALL
SUM OF ALL RESPONSES TO PHQ9 QUESTIONS 1 AND 2: 0
1. LITTLE INTEREST OR PLEASURE IN DOING THINGS: NOT AT ALL

## 2024-02-16 ASSESSMENT — PAIN SCALES - GENERAL: PAINLEVEL: 0-NO PAIN

## 2024-02-16 NOTE — PROGRESS NOTES
Patient ID:   Bairon Quigley is a 58 y.o. male with PMH remarkable for HTN, HLD who presents to the office today for Follow-up (No new concerns).    HEALTH MAINTENANCE: Follow Up  Smoking: never smoker  Labs: 9/6/2023  PSA: 0.74 on 9.6.2023  Colonoscopy (45-75): 7/19/18  Lung cancer screening (55-80 + 30 pack year + smoking/quit in last 15 years): 1/11/2022 and showed stable 5mm RLL nodules    SOCIAL HISTORY:  Social History     Tobacco Use    Smoking status: Never    Smokeless tobacco: Former   Substance Use Topics    Alcohol use: Never    Drug use: Never       IMMUNIZATIONS:  Immunization History   Administered Date(s) Administered    Pfizer Purple Cap SARS-CoV-2 03/20/2021, 04/10/2021, 01/03/2022       Social History     Tobacco Use    Smoking status: Never    Smokeless tobacco: Former   Substance Use Topics    Alcohol use: Never        Review of Systems   All other systems reviewed and are negative.      VITAL SIGNS:  Vitals:    02/16/24 1217   BP: (!) 142/96   Pulse: 75   Resp: 18   SpO2: 96%       ALLERGIES:  Allergies   Allergen Reactions    Influenza Virus Vaccine Whole Unknown        Physical Exam  Vitals reviewed.   Constitutional:       General: He is not in acute distress.     Appearance: Normal appearance. He is not ill-appearing.   HENT:      Head: Normocephalic and atraumatic.      Right Ear: Tympanic membrane and external ear normal.      Left Ear: Tympanic membrane and external ear normal.      Nose: Nose normal.      Mouth/Throat:      Mouth: Mucous membranes are moist.      Pharynx: Oropharynx is clear.   Eyes:      Conjunctiva/sclera: Conjunctivae normal.      Pupils: Pupils are equal, round, and reactive to light.   Cardiovascular:      Rate and Rhythm: Normal rate and regular rhythm.      Heart sounds: Normal heart sounds. No murmur heard.  Pulmonary:      Effort: Pulmonary effort is normal. No respiratory distress.      Breath sounds: Normal breath sounds. No wheezing.   Abdominal:       General: There is no distension.      Palpations: Abdomen is soft. There is no mass.      Tenderness: There is no abdominal tenderness.   Musculoskeletal:         General: Normal range of motion.      Cervical back: Normal range of motion and neck supple.   Skin:     General: Skin is warm and dry.   Neurological:      General: No focal deficit present.      Mental Status: He is alert and oriented to person, place, and time.      Sensory: No sensory deficit.      Motor: No weakness.      Coordination: Coordination normal.      Gait: Gait normal.   Psychiatric:         Mood and Affect: Mood normal.         Behavior: Behavior normal.         MEDICATIONS:  Current Outpatient Medications   Medication Instructions    carvedilol (COREG) 25 mg, oral, 2 times daily with meals    dilTIAZem CD (CARDIZEM CD) 120 mg, oral, Daily, To hold you over until your mail order script is delivered    dilTIAZem ER (TIAZAC) 420 mg, oral, Daily    dilTIAZem ER (TIAZAC) 120 mg, oral, Daily, Ok to take with 420mg daily to equal max dose of 540mg daily    esomeprazole (NEXIUM) 40 mg, oral, Daily    losartan-hydrochlorothiazide (Hyzaar) 100-25 mg tablet 1 tablet, oral, Daily    potassium chloride CR 20 mEq ER tablet 20 mEq, oral, 3 times daily    rosuvastatin (CRESTOR) 10 mg, oral, Daily    triamterene-hydrochlorothiazid (Maxzide-25) 37.5-25 mg tablet 1 tablet, oral, Daily        RECENT LABS:  Lab Results   Component Value Date    WBC 7.8 09/06/2023    HGB 15.8 09/06/2023    HCT 45.9 09/06/2023     09/06/2023    CHOL 150 09/06/2023    TRIG 141 09/06/2023    HDL 52.4 09/06/2023    ALT 18 09/06/2023    AST 28 09/06/2023     09/06/2023    K 3.2 (L) 09/06/2023     09/06/2023    CREATININE 1.15 09/06/2023    BUN 13 09/06/2023    CO2 28 09/06/2023    TSH 0.87 09/06/2023    INR 1.1 11/19/2021    HGBA1C 5.5 06/12/2020       ASSESSMENT AND PLAN:  Assessment/Plan   Bairon was seen today for follow-up.  Diagnoses and all orders for this  visit:  Healthcare maintenance  -     Full code  Benign hypertension  Comments:  - see below  - sent 2wks worth of 120mg diltiazem to local pharm and 3m worth to the pharmacy  Orders:  -     dilTIAZem ER (Tiazac) 120 mg 24 hr capsule; Take 1 capsule (120 mg) by mouth once daily. Ok to take with 420mg daily to equal max dose of 540mg daily  -     dilTIAZem CD (Cardizem CD) 120 mg 24 hr capsule; Take 1 capsule (120 mg) by mouth once daily for 14 days. To hold you over until your mail order script is delivered  Stage 3 chronic kidney disease, unspecified whether stage 3a or 3b CKD (CMS/[facility])  Comments:  - renally dose medications  - reviewed last kidney function  Hyperlipidemia, unspecified hyperlipidemia type  Other secondary hypertension  Comments:  - c/w coreg BID, losartan-HCTZ, Maxzide-25 QD  - CHANGE diltiazem to 420mg + 120mg = 540mg daily  - monitor BP at home       --------------------  Written by Lisa Lloyd RN, acting as a scribe for Dr. Henry. This note accurately reflects the work and decisions made by Dr. Henry.     I, Dr. Henry, attest all medical record entries made by the scribe were under my direction and were personally dictated by me. I have reviewed the chart and agree that the record accurately reflects my performance of the history, physical exam, and assessment and plan.

## 2024-02-19 PROBLEM — S22.49XA RIB FRACTURES: Status: RESOLVED | Noted: 2023-03-21 | Resolved: 2024-02-19

## 2024-02-19 PROBLEM — R07.81 RIB PAIN: Status: RESOLVED | Noted: 2023-03-21 | Resolved: 2024-02-19

## 2024-02-19 PROBLEM — T14.8XXA MUSCLE STRAIN: Status: RESOLVED | Noted: 2023-03-21 | Resolved: 2024-02-19

## 2024-02-19 PROBLEM — R94.31 ABNORMAL EKG: Status: RESOLVED | Noted: 2023-03-21 | Resolved: 2024-02-19

## 2024-02-19 PROBLEM — L01.02 PUSTULAR FOLLICULITIS: Status: RESOLVED | Noted: 2023-03-21 | Resolved: 2024-02-19
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
## 2024-02-19 PROBLEM — M77.12 LATERAL EPICONDYLITIS OF LEFT ELBOW: Status: RESOLVED | Noted: 2023-03-21 | Resolved: 2024-02-19

## 2024-02-19 PROBLEM — E78.00 HYPERCHOLESTEROLEMIA: Status: RESOLVED | Noted: 2023-03-21 | Resolved: 2024-02-19

## 2024-05-12 DIAGNOSIS — I10 BENIGN HYPERTENSION: ICD-10-CM

## 2024-05-12 DIAGNOSIS — K21.9 GASTROESOPHAGEAL REFLUX DISEASE WITHOUT ESOPHAGITIS: ICD-10-CM

## 2024-05-12 DIAGNOSIS — E78.00 HYPERCHOLESTEROLEMIA: ICD-10-CM

## 2024-05-12 DIAGNOSIS — E87.6 HYPOKALEMIA: ICD-10-CM

## 2024-05-13 RX ORDER — TRIAMTERENE/HYDROCHLOROTHIAZID 37.5-25 MG
1 TABLET ORAL DAILY
Qty: 90 TABLET | Refills: 0 | Status: SHIPPED | OUTPATIENT
Start: 2024-05-13 | End: 2024-05-21 | Stop reason: SDUPTHER

## 2024-05-13 RX ORDER — POTASSIUM CHLORIDE 20 MEQ/1
20 TABLET, EXTENDED RELEASE ORAL 3 TIMES DAILY
Qty: 270 TABLET | Refills: 0 | Status: SHIPPED | OUTPATIENT
Start: 2024-05-13 | End: 2024-05-21 | Stop reason: SDUPTHER

## 2024-05-13 RX ORDER — DILTIAZEM HYDROCHLORIDE 420 MG/1
420 CAPSULE, EXTENDED RELEASE ORAL DAILY
Qty: 90 CAPSULE | Refills: 0 | Status: SHIPPED | OUTPATIENT
Start: 2024-05-13 | End: 2024-05-21 | Stop reason: SDUPTHER

## 2024-05-13 RX ORDER — CARVEDILOL 25 MG/1
25 TABLET ORAL
Qty: 180 TABLET | Refills: 0 | Status: SHIPPED | OUTPATIENT
Start: 2024-05-13 | End: 2024-05-21 | Stop reason: SDUPTHER

## 2024-05-13 RX ORDER — LOSARTAN POTASSIUM AND HYDROCHLOROTHIAZIDE 25; 100 MG/1; MG/1
1 TABLET ORAL DAILY
Qty: 90 TABLET | Refills: 0 | Status: SHIPPED | OUTPATIENT
Start: 2024-05-13 | End: 2024-05-21 | Stop reason: SDUPTHER

## 2024-05-13 RX ORDER — ESOMEPRAZOLE MAGNESIUM 40 MG/1
40 CAPSULE, DELAYED RELEASE ORAL DAILY
Qty: 90 CAPSULE | Refills: 0 | Status: SHIPPED | OUTPATIENT
Start: 2024-05-13 | End: 2024-05-21 | Stop reason: SDUPTHER

## 2024-05-13 RX ORDER — ROSUVASTATIN CALCIUM 10 MG/1
10 TABLET, COATED ORAL DAILY
Qty: 90 TABLET | Refills: 0 | Status: SHIPPED | OUTPATIENT
Start: 2024-05-13 | End: 2024-05-21 | Stop reason: SDUPTHER

## 2024-05-21 DIAGNOSIS — K21.9 GASTROESOPHAGEAL REFLUX DISEASE WITHOUT ESOPHAGITIS: ICD-10-CM

## 2024-05-21 DIAGNOSIS — E78.00 HYPERCHOLESTEROLEMIA: ICD-10-CM

## 2024-05-21 DIAGNOSIS — I10 BENIGN HYPERTENSION: ICD-10-CM

## 2024-05-21 DIAGNOSIS — E87.6 HYPOKALEMIA: ICD-10-CM

## 2024-05-21 RX ORDER — CARVEDILOL 25 MG/1
25 TABLET ORAL
Qty: 180 TABLET | Refills: 0 | Status: SHIPPED | OUTPATIENT
Start: 2024-05-21

## 2024-05-21 RX ORDER — POTASSIUM CHLORIDE 20 MEQ/1
20 TABLET, EXTENDED RELEASE ORAL 3 TIMES DAILY
Qty: 270 TABLET | Refills: 0 | Status: SHIPPED | OUTPATIENT
Start: 2024-05-21

## 2024-05-21 RX ORDER — DILTIAZEM HYDROCHLORIDE 120 MG/1
120 CAPSULE, EXTENDED RELEASE ORAL DAILY
Qty: 90 CAPSULE | Refills: 1 | Status: SHIPPED | OUTPATIENT
Start: 2024-05-21

## 2024-05-21 RX ORDER — ESOMEPRAZOLE MAGNESIUM 40 MG/1
40 CAPSULE, DELAYED RELEASE ORAL DAILY
Qty: 90 CAPSULE | Refills: 0 | Status: SHIPPED | OUTPATIENT
Start: 2024-05-21

## 2024-05-21 RX ORDER — ROSUVASTATIN CALCIUM 10 MG/1
10 TABLET, COATED ORAL DAILY
Qty: 90 TABLET | Refills: 0 | Status: SHIPPED | OUTPATIENT
Start: 2024-05-21

## 2024-05-21 RX ORDER — DILTIAZEM HYDROCHLORIDE 420 MG/1
420 CAPSULE, EXTENDED RELEASE ORAL DAILY
Qty: 90 CAPSULE | Refills: 0 | Status: SHIPPED | OUTPATIENT
Start: 2024-05-21

## 2024-05-21 RX ORDER — LOSARTAN POTASSIUM AND HYDROCHLOROTHIAZIDE 25; 100 MG/1; MG/1
1 TABLET ORAL DAILY
Qty: 90 TABLET | Refills: 0 | Status: SHIPPED | OUTPATIENT
Start: 2024-05-21

## 2024-05-21 RX ORDER — TRIAMTERENE/HYDROCHLOROTHIAZID 37.5-25 MG
1 TABLET ORAL DAILY
Qty: 90 TABLET | Refills: 0 | Status: SHIPPED | OUTPATIENT
Start: 2024-05-21

## 2024-07-10 ENCOUNTER — APPOINTMENT (OUTPATIENT)
Dept: PRIMARY CARE | Facility: CLINIC | Age: 59
End: 2024-07-10
Payer: COMMERCIAL

## 2024-07-10 VITALS
BODY MASS INDEX: 32.11 KG/M2 | HEIGHT: 69 IN | OXYGEN SATURATION: 96 % | RESPIRATION RATE: 18 BRPM | DIASTOLIC BLOOD PRESSURE: 83 MMHG | WEIGHT: 216.8 LBS | SYSTOLIC BLOOD PRESSURE: 123 MMHG | HEART RATE: 57 BPM

## 2024-07-10 DIAGNOSIS — E78.5 HYPERLIPIDEMIA, UNSPECIFIED HYPERLIPIDEMIA TYPE: ICD-10-CM

## 2024-07-10 DIAGNOSIS — I15.8 OTHER SECONDARY HYPERTENSION: ICD-10-CM

## 2024-07-10 DIAGNOSIS — E87.6 HYPOKALEMIA: ICD-10-CM

## 2024-07-10 DIAGNOSIS — K21.9 GASTROESOPHAGEAL REFLUX DISEASE, UNSPECIFIED WHETHER ESOPHAGITIS PRESENT: ICD-10-CM

## 2024-07-10 PROCEDURE — 3074F SYST BP LT 130 MM HG: CPT | Performed by: INTERNAL MEDICINE

## 2024-07-10 PROCEDURE — 99213 OFFICE O/P EST LOW 20 MIN: CPT | Performed by: INTERNAL MEDICINE

## 2024-07-10 PROCEDURE — 1036F TOBACCO NON-USER: CPT | Performed by: INTERNAL MEDICINE

## 2024-07-10 PROCEDURE — 3079F DIAST BP 80-89 MM HG: CPT | Performed by: INTERNAL MEDICINE

## 2024-07-10 ASSESSMENT — PAIN SCALES - GENERAL: PAINLEVEL: 0-NO PAIN

## 2024-07-10 ASSESSMENT — PATIENT HEALTH QUESTIONNAIRE - PHQ9
SUM OF ALL RESPONSES TO PHQ9 QUESTIONS 1 AND 2: 0
1. LITTLE INTEREST OR PLEASURE IN DOING THINGS: NOT AT ALL
2. FEELING DOWN, DEPRESSED OR HOPELESS: NOT AT ALL

## 2024-07-10 NOTE — PROGRESS NOTES
"Patient ID: He states his Bp has been good. Denies any chest pain, SOB or cough. Denies any depression. He denies any swelling in his lower extremities. He states he has been working a lot. He denies any issues with recent allergies. He states he has been taking his Potassium supplement.     HEALTH MAINTENANCE: Follow Up  Last Office Visit: 2/16/24  Last Labs: 9/6/23  PSA Screening (starting at age 55 till 69): 9/6/23  Colonoscopy (45-75 or age 40 with 1st degree relative dx colon ca): 7/19/18  Lung cancer screening (50-78 y/o + 20 pack year + smoking/quit in last 15 years): 1/11/2022 and showed stable 5mm RLL nodules     HPI Bairon Quigley is a 59 y.o. male with PMH remarkable for  HTN, HLD  who presents to the office today for Follow-up (5 mth ).    Social History     Tobacco Use    Smoking status: Never    Smokeless tobacco: Former   Substance Use Topics    Alcohol use: Never    Drug use: Never       Review of Systems   Constitutional: Negative.    Respiratory: Negative.     Cardiovascular: Negative.    Gastrointestinal: Negative.    Genitourinary: Negative.    Musculoskeletal: Negative.    Neurological: Negative.    Psychiatric/Behavioral: Negative.         Visit Vitals  /83   Pulse 57   Resp 18   Ht 1.753 m (5' 9\")   Wt 98.3 kg (216 lb 12.8 oz)   SpO2 96%   BMI 32.02 kg/m²   Smoking Status Never   BSA 2.19 m²       Physical Exam  Vitals reviewed.   Constitutional:       Appearance: Normal appearance.   HENT:      Head: Normocephalic and atraumatic.   Cardiovascular:      Rate and Rhythm: Normal rate and regular rhythm.      Pulses: Normal pulses.      Heart sounds: Normal heart sounds.   Pulmonary:      Effort: Pulmonary effort is normal.      Breath sounds: Wheezing present.   Abdominal:      General: Bowel sounds are normal.      Palpations: Abdomen is soft.   Musculoskeletal:         General: Normal range of motion.   Skin:     General: Skin is warm and dry.   Neurological:      General: No focal " deficit present.      Mental Status: He is alert and oriented to person, place, and time.   Psychiatric:         Mood and Affect: Mood normal.         Behavior: Behavior normal.         Current Outpatient Medications   Medication Instructions    carvedilol (COREG) 25 mg, oral, 2 times daily (morning and late afternoon)    dilTIAZem CD (CARDIZEM CD) 120 mg, oral, Daily, To hold you over until your mail order script is delivered    dilTIAZem ER (TIAZAC) 420 mg, oral, Daily    dilTIAZem ER (TIAZAC) 120 mg, oral, Daily, Ok to take with 420mg daily to equal max dose of 540mg daily    esomeprazole (NEXIUM) 40 mg, oral, Daily    losartan-hydrochlorothiazide (Hyzaar) 100-25 mg tablet 1 tablet, oral, Daily    potassium chloride CR 20 mEq ER tablet 20 mEq, oral, 3 times daily    rosuvastatin (CRESTOR) 10 mg, oral, Daily    triamterene-hydrochlorothiazid (Maxzide-25) 37.5-25 mg tablet 1 tablet, oral, Daily        Lab Results   Component Value Date    WBC 7.8 09/06/2023    HGB 15.8 09/06/2023    HCT 45.9 09/06/2023     09/06/2023    CHOL 150 09/06/2023    TRIG 141 09/06/2023    HDL 52.4 09/06/2023    ALT 18 09/06/2023    AST 28 09/06/2023     09/06/2023    K 3.2 (L) 09/06/2023     09/06/2023    CREATININE 1.15 09/06/2023    BUN 13 09/06/2023    CO2 28 09/06/2023    TSH 0.87 09/06/2023    INR 1.1 11/19/2021    HGBA1C 5.5 06/12/2020       Problem List Items Addressed This Visit             ICD-10-CM    Hypertension I10     BP is good today, 128/83  Continue with Diltiazem, BP better controlled since increasing dose  Continue with Maxzide and Coreg  Continue with potassium supplement         Esophageal reflux K21.9     Stable on PPI         Hyperlipidemia E78.5     C/w statin         Hypokalemia E87.6     He states he has been taking his K+ supplement  Advised patient to have labwork drawn that was previously ordered to check potassium level as it was previously low            --------------------  Written by  Krista Lyon LPN, acting as a scribe for Dr. Henry. This note accurately reflects the work and decisions made by Dr. Henry.     I, Dr. Henry, attest all medical record entries made by the scribe were under my direction and were personally dictated by me. I have reviewed the chart and agree that the record accurately reflects my performance of the history, physical exam, and assessment and plan.

## 2024-07-12 ASSESSMENT — ENCOUNTER SYMPTOMS
RESPIRATORY NEGATIVE: 1
PSYCHIATRIC NEGATIVE: 1
CARDIOVASCULAR NEGATIVE: 1
GASTROINTESTINAL NEGATIVE: 1
NEUROLOGICAL NEGATIVE: 1
MUSCULOSKELETAL NEGATIVE: 1
CONSTITUTIONAL NEGATIVE: 1

## 2024-07-13 NOTE — PATIENT INSTRUCTIONS
It was great to see you in the office today! Here is what we discussed at your visit today:  Please continue to take your current medications   Please get bloodwork drawn as soon as you are able. We will call you with results.

## 2024-07-13 NOTE — ASSESSMENT & PLAN NOTE
BP is good today, 128/83  Continue with Diltiazem, BP better controlled since increasing dose  Continue with Maxzide and Coreg  Continue with potassium supplement

## 2024-07-13 NOTE — ASSESSMENT & PLAN NOTE
He states he has been taking his K+ supplement  Advised patient to have labwork drawn that was previously ordered to check potassium level as it was previously low

## 2024-09-03 DIAGNOSIS — K21.9 GASTROESOPHAGEAL REFLUX DISEASE WITHOUT ESOPHAGITIS: ICD-10-CM

## 2024-09-03 DIAGNOSIS — E78.00 HYPERCHOLESTEROLEMIA: ICD-10-CM

## 2024-09-03 DIAGNOSIS — I10 BENIGN HYPERTENSION: ICD-10-CM

## 2024-09-03 DIAGNOSIS — E87.6 HYPOKALEMIA: ICD-10-CM

## 2024-09-04 RX ORDER — ESOMEPRAZOLE MAGNESIUM 40 MG/1
40 CAPSULE, DELAYED RELEASE ORAL DAILY
Qty: 90 CAPSULE | Refills: 0 | Status: SHIPPED | OUTPATIENT
Start: 2024-09-04

## 2024-09-04 RX ORDER — POTASSIUM CHLORIDE 20 MEQ/1
20 TABLET, EXTENDED RELEASE ORAL 3 TIMES DAILY
Qty: 270 TABLET | Refills: 0 | Status: SHIPPED | OUTPATIENT
Start: 2024-09-04

## 2024-09-04 RX ORDER — LOSARTAN POTASSIUM AND HYDROCHLOROTHIAZIDE 25; 100 MG/1; MG/1
1 TABLET ORAL DAILY
Qty: 90 TABLET | Refills: 0 | Status: SHIPPED | OUTPATIENT
Start: 2024-09-04

## 2024-09-04 RX ORDER — CARVEDILOL 25 MG/1
25 TABLET ORAL
Qty: 180 TABLET | Refills: 0 | Status: SHIPPED | OUTPATIENT
Start: 2024-09-04

## 2024-09-04 RX ORDER — ROSUVASTATIN CALCIUM 10 MG/1
10 TABLET, COATED ORAL DAILY
Qty: 90 TABLET | Refills: 0 | Status: SHIPPED | OUTPATIENT
Start: 2024-09-04

## 2024-09-04 RX ORDER — TRIAMTERENE/HYDROCHLOROTHIAZID 37.5-25 MG
1 TABLET ORAL DAILY
Qty: 90 TABLET | Refills: 0 | Status: SHIPPED | OUTPATIENT
Start: 2024-09-04

## 2024-09-04 RX ORDER — DILTIAZEM HYDROCHLORIDE 420 MG/1
420 CAPSULE, EXTENDED RELEASE ORAL DAILY
Qty: 90 CAPSULE | Refills: 0 | Status: SHIPPED | OUTPATIENT
Start: 2024-09-04

## 2024-09-04 RX ORDER — DILTIAZEM HYDROCHLORIDE 120 MG/1
120 CAPSULE, EXTENDED RELEASE ORAL DAILY
Qty: 90 CAPSULE | Refills: 1 | Status: SHIPPED | OUTPATIENT
Start: 2024-09-04

## 2024-12-11 ENCOUNTER — APPOINTMENT (OUTPATIENT)
Dept: PRIMARY CARE | Facility: CLINIC | Age: 59
End: 2024-12-11
Payer: COMMERCIAL

## 2024-12-11 VITALS
HEIGHT: 69 IN | WEIGHT: 226.8 LBS | SYSTOLIC BLOOD PRESSURE: 130 MMHG | DIASTOLIC BLOOD PRESSURE: 87 MMHG | BODY MASS INDEX: 33.59 KG/M2 | OXYGEN SATURATION: 98 % | RESPIRATION RATE: 18 BRPM | HEART RATE: 86 BPM

## 2024-12-11 DIAGNOSIS — E87.6 HYPOKALEMIA: ICD-10-CM

## 2024-12-11 DIAGNOSIS — I15.8 OTHER SECONDARY HYPERTENSION: ICD-10-CM

## 2024-12-11 DIAGNOSIS — R79.89 LOW VITAMIN B12 LEVEL: ICD-10-CM

## 2024-12-11 DIAGNOSIS — R79.89 LOW VITAMIN D LEVEL: ICD-10-CM

## 2024-12-11 DIAGNOSIS — E78.5 HYPERLIPIDEMIA, UNSPECIFIED HYPERLIPIDEMIA TYPE: ICD-10-CM

## 2024-12-11 DIAGNOSIS — N18.30 STAGE 3 CHRONIC KIDNEY DISEASE, UNSPECIFIED WHETHER STAGE 3A OR 3B CKD (MULTI): ICD-10-CM

## 2024-12-11 DIAGNOSIS — K21.9 GASTROESOPHAGEAL REFLUX DISEASE, UNSPECIFIED WHETHER ESOPHAGITIS PRESENT: ICD-10-CM

## 2024-12-11 PROCEDURE — 3079F DIAST BP 80-89 MM HG: CPT | Performed by: INTERNAL MEDICINE

## 2024-12-11 PROCEDURE — 99214 OFFICE O/P EST MOD 30 MIN: CPT | Performed by: INTERNAL MEDICINE

## 2024-12-11 PROCEDURE — 3008F BODY MASS INDEX DOCD: CPT | Performed by: INTERNAL MEDICINE

## 2024-12-11 PROCEDURE — 3075F SYST BP GE 130 - 139MM HG: CPT | Performed by: INTERNAL MEDICINE

## 2024-12-11 PROCEDURE — 1036F TOBACCO NON-USER: CPT | Performed by: INTERNAL MEDICINE

## 2024-12-11 RX ORDER — LOSARTAN POTASSIUM 100 MG/1
100 TABLET ORAL DAILY
Qty: 90 TABLET | Refills: 1 | Status: SHIPPED | OUTPATIENT
Start: 2024-12-11 | End: 2025-12-11

## 2024-12-11 ASSESSMENT — PAIN SCALES - GENERAL: PAINLEVEL_OUTOF10: 0-NO PAIN

## 2024-12-11 NOTE — PROGRESS NOTES
"Patient ID: He states he is doing ok. He denies any CP, cough or SOB. Denies any issues with bowels. Denies any issues with urination. He states he has been on Triamterene-hydrochlorothiazide and Losartan-hydrochlorothiazide for quite awhile.     HEALTH MAINTENANCE: Follow Up  Last Office Visit: 7/10/24  Last Labs: 9/6/23  PSA Screening (starting at age 55 till 69): 9/6/23 wnl  Colonoscopy (45-75 or age 40 with 1st degree relative dx colon ca): 7/19/2018  Lung cancer screening (50-76 y/o x 20 pk yr for at least 20 yrs + current smoker OR quit in last 15 years, no CT w/I last year):     ANY Quigley is a 59 y.o. male with PMH remarkable for HTN, HLD  who presents to the office today for follow up.     Social History     Tobacco Use    Smoking status: Never    Smokeless tobacco: Former   Substance Use Topics    Alcohol use: Never    Drug use: Never     Review of Systems   Constitutional: Negative.    HENT: Negative.     Respiratory: Negative.     Cardiovascular: Negative.    Gastrointestinal: Negative.    Genitourinary: Negative.    Musculoskeletal: Negative.    Skin: Negative.    Neurological: Negative.    Psychiatric/Behavioral: Negative.       Visit Vitals  Vitals:    12/11/24 1402   BP: 130/87   Pulse: 86   Resp: 18   SpO2: 98%   Weight: 103 kg (226 lb 12.8 oz)   Height: 1.753 m (5' 9\")      Smoking Status Never     Physical Exam  Vitals reviewed.   Constitutional:       Appearance: Normal appearance.   HENT:      Head: Normocephalic and atraumatic.   Cardiovascular:      Rate and Rhythm: Normal rate and regular rhythm.      Pulses: Normal pulses.      Heart sounds: Normal heart sounds.   Pulmonary:      Effort: Pulmonary effort is normal.      Breath sounds: Normal breath sounds.   Abdominal:      General: Bowel sounds are normal.      Palpations: Abdomen is soft.   Musculoskeletal:         General: Normal range of motion.   Skin:     General: Skin is warm and dry.   Neurological:      General: No focal " deficit present.      Mental Status: He is alert and oriented to person, place, and time.   Psychiatric:         Mood and Affect: Mood normal.         Behavior: Behavior normal.       Current Outpatient Medications   Medication Instructions    carvedilol (COREG) 25 mg, oral, 2 times daily (morning and late afternoon)    dilTIAZem ER (TIAZAC) 120 mg, oral, Daily, Ok to take with 420mg daily to equal max dose of 540mg daily    dilTIAZem ER (TIAZAC) 420 mg, oral, Daily    esomeprazole (NEXIUM) 40 mg, oral, Daily    losartan-hydrochlorothiazide (Hyzaar) 100-25 mg tablet 1 tablet, oral, Daily    potassium chloride CR 20 mEq ER tablet 20 mEq, oral, 3 times daily    rosuvastatin (CRESTOR) 10 mg, oral, Daily    triamterene-hydrochlorothiazid (Maxzide-25) 37.5-25 mg tablet 1 tablet, oral, Daily      Lab Results   Component Value Date    WBC 7.8 09/06/2023    HGB 15.8 09/06/2023    HCT 45.9 09/06/2023     09/06/2023    CHOL 150 09/06/2023    TRIG 141 09/06/2023    HDL 52.4 09/06/2023    ALT 18 09/06/2023    AST 28 09/06/2023     09/06/2023    K 3.2 (L) 09/06/2023     09/06/2023    CREATININE 1.15 09/06/2023    BUN 13 09/06/2023    CO2 28 09/06/2023    TSH 0.87 09/06/2023    INR 1.1 11/19/2021    HGBA1C 5.5 06/12/2020     Problem List Items Addressed This Visit             ICD-10-CM    Hypertension I10     Continue with Diltiazem, Carvedilol and triamterene-hydrochlorothiazide   Will stop losartan-hydrochlorothiazide and change to Losartan 100mg daily, advised ok finish his supply at home(has about two weeks left) before starting Losartan by itself  Advised to get BMP two weeks after starting Losartan without hydrochlorothiazide, will adjust Potassium dose accordingly         Relevant Medications    losartan (Cozaar) 100 mg tablet    Other Relevant Orders    Tsh With Reflex To Free T4 If Abnormal    Esophageal reflux K21.9     Continue with PPI         Relevant Orders    CBC and Auto Differential     Hyperlipidemia E78.5     Continue with statin         Relevant Orders    Lipid panel    Hypokalemia E87.6    Relevant Orders    Comprehensive metabolic panel    Stage 3 chronic kidney disease (Multi) N18.30    Relevant Orders    Comprehensive metabolic panel     Other Visit Diagnoses         Codes    Low vitamin D level     R79.89    Relevant Orders    Vitamin D 25-Hydroxy,Total (for eval of Vitamin D levels)    Low vitamin B12 level     R79.89    Relevant Orders    Vitamin B12        --------------------  Written by Krista Lyon RN, acting as a scribe for Dr. Henry. This note accurately reflects the work and decisions made by Dr. Henry.     I, Dr. Henry, attest all medical record entries made by the scribe were under my direction and were personally dictated by me. I have reviewed the chart and agree that the record accurately reflects my performance of the history, physical exam, and assessment and plan.

## 2024-12-11 NOTE — PATIENT INSTRUCTIONS
It was great to see you in the office today! Here is what we discussed at your visit today:    After you finish current supply of medications, change Hyzaar as discussed to Losartan only, new prescription sent in    Approximately 2 weeks after starting on Losartan by itself , get bloodwork drawn, it is fasting bloodwork  We will call you with results    Follow up in four months

## 2024-12-12 ASSESSMENT — ENCOUNTER SYMPTOMS
GASTROINTESTINAL NEGATIVE: 1
CONSTITUTIONAL NEGATIVE: 1
MUSCULOSKELETAL NEGATIVE: 1
PSYCHIATRIC NEGATIVE: 1
CARDIOVASCULAR NEGATIVE: 1
NEUROLOGICAL NEGATIVE: 1
RESPIRATORY NEGATIVE: 1

## 2024-12-12 NOTE — ASSESSMENT & PLAN NOTE
Continue with Diltiazem, Carvedilol and triamterene-hydrochlorothiazide   Will stop losartan-hydrochlorothiazide and change to Losartan 100mg daily, advised ok finish his supply at home(has about two weeks left) before starting Losartan by itself  Advised to get BMP two weeks after starting Losartan without hydrochlorothiazide, will adjust Potassium dose accordingly

## 2024-12-20 DIAGNOSIS — K21.9 GASTROESOPHAGEAL REFLUX DISEASE WITHOUT ESOPHAGITIS: ICD-10-CM

## 2024-12-20 DIAGNOSIS — I10 BENIGN HYPERTENSION: ICD-10-CM

## 2024-12-20 DIAGNOSIS — E87.6 HYPOKALEMIA: ICD-10-CM

## 2024-12-20 DIAGNOSIS — I15.8 OTHER SECONDARY HYPERTENSION: ICD-10-CM

## 2024-12-20 DIAGNOSIS — E78.00 HYPERCHOLESTEROLEMIA: ICD-10-CM

## 2024-12-20 RX ORDER — TRIAMTERENE/HYDROCHLOROTHIAZID 37.5-25 MG
1 TABLET ORAL DAILY
Qty: 90 TABLET | Refills: 0 | Status: SHIPPED | OUTPATIENT
Start: 2024-12-20

## 2024-12-20 RX ORDER — POTASSIUM CHLORIDE 20 MEQ/1
20 TABLET, EXTENDED RELEASE ORAL 3 TIMES DAILY
Qty: 270 TABLET | Refills: 0 | Status: SHIPPED | OUTPATIENT
Start: 2024-12-20

## 2024-12-20 RX ORDER — CARVEDILOL 25 MG/1
25 TABLET ORAL
Qty: 180 TABLET | Refills: 0 | Status: SHIPPED | OUTPATIENT
Start: 2024-12-20

## 2024-12-20 RX ORDER — ROSUVASTATIN CALCIUM 10 MG/1
10 TABLET, COATED ORAL DAILY
Qty: 90 TABLET | Refills: 0 | Status: SHIPPED | OUTPATIENT
Start: 2024-12-20

## 2024-12-20 RX ORDER — DILTIAZEM HYDROCHLORIDE 120 MG/1
120 CAPSULE, EXTENDED RELEASE ORAL DAILY
Qty: 90 CAPSULE | Refills: 0 | Status: SHIPPED | OUTPATIENT
Start: 2024-12-20

## 2024-12-20 RX ORDER — LOSARTAN POTASSIUM 100 MG/1
100 TABLET ORAL DAILY
Qty: 90 TABLET | Refills: 0 | Status: SHIPPED | OUTPATIENT
Start: 2024-12-20 | End: 2025-12-20

## 2024-12-20 RX ORDER — DILTIAZEM HYDROCHLORIDE 420 MG/1
420 CAPSULE, EXTENDED RELEASE ORAL DAILY
Qty: 90 CAPSULE | Refills: 0 | Status: SHIPPED | OUTPATIENT
Start: 2024-12-20

## 2024-12-20 RX ORDER — ESOMEPRAZOLE MAGNESIUM 40 MG/1
40 CAPSULE, DELAYED RELEASE ORAL DAILY
Qty: 90 CAPSULE | Refills: 0 | Status: SHIPPED | OUTPATIENT
Start: 2024-12-20

## 2025-01-17 ENCOUNTER — LAB (OUTPATIENT)
Dept: LAB | Facility: LAB | Age: 60
End: 2025-01-17
Payer: COMMERCIAL

## 2025-01-17 DIAGNOSIS — N18.30 STAGE 3 CHRONIC KIDNEY DISEASE, UNSPECIFIED WHETHER STAGE 3A OR 3B CKD (MULTI): ICD-10-CM

## 2025-01-17 DIAGNOSIS — E87.6 HYPOKALEMIA: ICD-10-CM

## 2025-01-17 DIAGNOSIS — K21.9 GASTROESOPHAGEAL REFLUX DISEASE, UNSPECIFIED WHETHER ESOPHAGITIS PRESENT: ICD-10-CM

## 2025-01-17 DIAGNOSIS — E78.5 HYPERLIPIDEMIA, UNSPECIFIED HYPERLIPIDEMIA TYPE: ICD-10-CM

## 2025-01-17 DIAGNOSIS — I15.8 OTHER SECONDARY HYPERTENSION: ICD-10-CM

## 2025-01-17 DIAGNOSIS — R79.89 LOW VITAMIN D LEVEL: ICD-10-CM

## 2025-01-17 DIAGNOSIS — R79.89 LOW VITAMIN B12 LEVEL: ICD-10-CM

## 2025-01-17 LAB
25(OH)D3 SERPL-MCNC: 28 NG/ML (ref 30–100)
ALBUMIN SERPL BCP-MCNC: 4.7 G/DL (ref 3.4–5)
ALP SERPL-CCNC: 55 U/L (ref 33–120)
ALT SERPL W P-5'-P-CCNC: 24 U/L (ref 10–52)
ANION GAP SERPL CALC-SCNC: 14 MMOL/L (ref 10–20)
AST SERPL W P-5'-P-CCNC: 28 U/L (ref 9–39)
BASOPHILS # BLD AUTO: 0.04 X10*3/UL (ref 0–0.1)
BASOPHILS NFR BLD AUTO: 0.5 %
BILIRUB SERPL-MCNC: 0.5 MG/DL (ref 0–1.2)
BUN SERPL-MCNC: 15 MG/DL (ref 6–23)
CALCIUM SERPL-MCNC: 9.8 MG/DL (ref 8.6–10.3)
CHLORIDE SERPL-SCNC: 109 MMOL/L (ref 98–107)
CHOLEST SERPL-MCNC: 164 MG/DL (ref 0–199)
CHOLESTEROL/HDL RATIO: 3
CO2 SERPL-SCNC: 28 MMOL/L (ref 21–32)
CREAT SERPL-MCNC: 1.26 MG/DL (ref 0.5–1.3)
EGFRCR SERPLBLD CKD-EPI 2021: 66 ML/MIN/1.73M*2
EOSINOPHIL # BLD AUTO: 0.84 X10*3/UL (ref 0–0.7)
EOSINOPHIL NFR BLD AUTO: 10.7 %
ERYTHROCYTE [DISTWIDTH] IN BLOOD BY AUTOMATED COUNT: 14 % (ref 11.5–14.5)
GLUCOSE SERPL-MCNC: 90 MG/DL (ref 74–99)
HCT VFR BLD AUTO: 45.2 % (ref 41–52)
HDLC SERPL-MCNC: 54.7 MG/DL
HGB BLD-MCNC: 15.3 G/DL (ref 13.5–17.5)
IMM GRANULOCYTES # BLD AUTO: 0.02 X10*3/UL (ref 0–0.7)
IMM GRANULOCYTES NFR BLD AUTO: 0.3 % (ref 0–0.9)
LDLC SERPL CALC-MCNC: 80 MG/DL
LYMPHOCYTES # BLD AUTO: 1.58 X10*3/UL (ref 1.2–4.8)
LYMPHOCYTES NFR BLD AUTO: 20.1 %
MCH RBC QN AUTO: 30 PG (ref 26–34)
MCHC RBC AUTO-ENTMCNC: 33.8 G/DL (ref 32–36)
MCV RBC AUTO: 89 FL (ref 80–100)
MONOCYTES # BLD AUTO: 0.59 X10*3/UL (ref 0.1–1)
MONOCYTES NFR BLD AUTO: 7.5 %
NEUTROPHILS # BLD AUTO: 4.81 X10*3/UL (ref 1.2–7.7)
NEUTROPHILS NFR BLD AUTO: 60.9 %
NON HDL CHOLESTEROL: 109 MG/DL (ref 0–149)
NRBC BLD-RTO: 0 /100 WBCS (ref 0–0)
PLATELET # BLD AUTO: 292 X10*3/UL (ref 150–450)
POTASSIUM SERPL-SCNC: 3.9 MMOL/L (ref 3.5–5.3)
PROT SERPL-MCNC: 7 G/DL (ref 6.4–8.2)
RBC # BLD AUTO: 5.1 X10*6/UL (ref 4.5–5.9)
SODIUM SERPL-SCNC: 147 MMOL/L (ref 136–145)
TRIGL SERPL-MCNC: 149 MG/DL (ref 0–149)
TSH SERPL-ACNC: 0.66 MIU/L (ref 0.44–3.98)
VIT B12 SERPL-MCNC: 329 PG/ML (ref 211–911)
VLDL: 30 MG/DL (ref 0–40)
WBC # BLD AUTO: 7.9 X10*3/UL (ref 4.4–11.3)

## 2025-01-17 PROCEDURE — 82607 VITAMIN B-12: CPT

## 2025-01-17 PROCEDURE — 82306 VITAMIN D 25 HYDROXY: CPT

## 2025-01-27 ENCOUNTER — OFFICE VISIT (OUTPATIENT)
Dept: URGENT CARE | Age: 60
End: 2025-01-27
Payer: COMMERCIAL

## 2025-01-27 VITALS
SYSTOLIC BLOOD PRESSURE: 150 MMHG | HEART RATE: 68 BPM | OXYGEN SATURATION: 97 % | DIASTOLIC BLOOD PRESSURE: 88 MMHG | RESPIRATION RATE: 18 BRPM | TEMPERATURE: 98 F

## 2025-01-27 DIAGNOSIS — J10.1 INFLUENZA A: Primary | ICD-10-CM

## 2025-01-27 DIAGNOSIS — J40 BRONCHITIS: ICD-10-CM

## 2025-01-27 DIAGNOSIS — R52 BODY ACHES: ICD-10-CM

## 2025-01-27 DIAGNOSIS — R05.1 ACUTE COUGH: ICD-10-CM

## 2025-01-27 DIAGNOSIS — R06.2 WHEEZING: ICD-10-CM

## 2025-01-27 DIAGNOSIS — R50.9 FEVER, UNSPECIFIED FEVER CAUSE: ICD-10-CM

## 2025-01-27 LAB
POC RAPID INFLUENZA A: POSITIVE
POC RAPID INFLUENZA B: NEGATIVE

## 2025-01-27 RX ORDER — OSELTAMIVIR PHOSPHATE 75 MG/1
75 CAPSULE ORAL 2 TIMES DAILY
Qty: 10 CAPSULE | Refills: 0 | Status: SHIPPED | OUTPATIENT
Start: 2025-01-27 | End: 2025-02-01

## 2025-01-27 RX ORDER — AZITHROMYCIN 250 MG/1
TABLET, FILM COATED ORAL
Qty: 6 TABLET | Refills: 0 | Status: SHIPPED | OUTPATIENT
Start: 2025-01-27 | End: 2025-02-01

## 2025-01-27 RX ORDER — PREDNISONE 20 MG/1
20 TABLET ORAL 2 TIMES DAILY
Qty: 10 TABLET | Refills: 0 | Status: SHIPPED | OUTPATIENT
Start: 2025-01-27 | End: 2025-02-01

## 2025-01-27 RX ORDER — ALBUTEROL SULFATE 90 UG/1
2 INHALANT RESPIRATORY (INHALATION) EVERY 4 HOURS PRN
Qty: 6.7 G | Refills: 0 | Status: SHIPPED | OUTPATIENT
Start: 2025-01-27 | End: 2026-01-27

## 2025-01-27 ASSESSMENT — ENCOUNTER SYMPTOMS
FATIGUE: 1
MUSCULOSKELETAL NEGATIVE: 1
GASTROINTESTINAL NEGATIVE: 1
PSYCHIATRIC NEGATIVE: 1
HEMATOLOGIC/LYMPHATIC NEGATIVE: 1
RHINORRHEA: 1
EYES NEGATIVE: 1
ENDOCRINE NEGATIVE: 1
NEUROLOGICAL NEGATIVE: 1
COUGH: 1
CARDIOVASCULAR NEGATIVE: 1
WHEEZING: 1
ALLERGIC/IMMUNOLOGIC NEGATIVE: 1
SORE THROAT: 1

## 2025-01-27 NOTE — PROGRESS NOTES
Subjective   Patient ID: Bairon Quigley is a 59 y.o. male. They present today with a chief complaint of Cough, Generalized Body Aches, and Nasal Congestion (3 days of symptoms ).    History of Present Illness    History provided by:  Patient   used: No    Cough  This is a new problem. The current episode started 1 to 4 weeks ago. The cough is Productive of brown sputum. Associated symptoms include ear pain, postnasal drip, rhinorrhea, a sore throat and wheezing. The symptoms are aggravated by lying down. He has tried OTC cough suppressant for the symptoms.       Past Medical History  Allergies as of 01/27/2025 - Reviewed 01/27/2025   Allergen Reaction Noted    Influenza virus vaccine whole Unknown 03/21/2023       (Not in a hospital admission)       Past Medical History:   Diagnosis Date    Abnormal EKG 03/21/2023    History of falling 11/12/2021    History of fall    Personal history of other diseases of the circulatory system     History of hypertension    Personal history of other endocrine, nutritional and metabolic disease     History of hypokalemia    Personal history of other endocrine, nutritional and metabolic disease     History of hypercholesterolemia    Pleurodynia 11/12/2021    Rib pain    Rib fractures 03/21/2023       Past Surgical History:   Procedure Laterality Date    CHEST TUBE INSERTION      RIB FRACTURE SURGERY          reports that he has never smoked. He has quit using smokeless tobacco. He reports that he does not drink alcohol and does not use drugs.    Review of Systems  Review of Systems   Constitutional:  Positive for fatigue.   HENT:  Positive for ear pain, postnasal drip, rhinorrhea and sore throat.    Eyes: Negative.    Respiratory:  Positive for cough and wheezing.    Cardiovascular: Negative.    Gastrointestinal: Negative.    Endocrine: Negative.    Genitourinary: Negative.    Musculoskeletal: Negative.    Skin: Negative.    Allergic/Immunologic: Negative.     Neurological: Negative.    Hematological: Negative.    Psychiatric/Behavioral: Negative.                                    Objective    Vitals:    01/27/25 1542 01/27/25 1615   BP: (!) 170/119 150/88   BP Location: Left arm    Patient Position: Sitting    BP Cuff Size: Adult    Pulse: 68    Resp: 18    Temp: 36.7 °C (98 °F)    TempSrc: Oral    SpO2: 97%      No LMP for male patient.    Physical Exam  Vitals reviewed.   Constitutional:       Appearance: Normal appearance. He is normal weight.   HENT:      Right Ear: Tympanic membrane is erythematous and bulging.      Left Ear: Tympanic membrane is erythematous and bulging.      Nose: Nasal tenderness, mucosal edema, congestion and rhinorrhea present. Rhinorrhea is purulent.      Right Sinus: Maxillary sinus tenderness present.      Left Sinus: Maxillary sinus tenderness present.   Cardiovascular:      Rate and Rhythm: Normal rate and regular rhythm.      Pulses: Normal pulses.      Heart sounds: Normal heart sounds.   Pulmonary:      Effort: Pulmonary effort is normal.      Breath sounds: Wheezing present.   Abdominal:      General: Bowel sounds are normal.      Palpations: Abdomen is soft.   Musculoskeletal:      Cervical back: Normal range of motion.   Skin:     General: Skin is warm.   Neurological:      General: No focal deficit present.      Mental Status: He is alert and oriented to person, place, and time. Mental status is at baseline.   Psychiatric:         Mood and Affect: Mood normal.         Behavior: Behavior normal.         Thought Content: Thought content normal.         Judgment: Judgment normal.         Procedures    Point of Care Test & Imaging Results from this visit  Results for orders placed or performed in visit on 01/27/25   POCT Influenza A/B manually resulted   Result Value Ref Range    POC Rapid Influenza A Positive (A) Negative    POC Rapid Influenza B Negative Negative      No results found.    Diagnostic study results (if any) were  reviewed by BONIFACIO Sanchez.    Assessment/Plan   Allergies, medications, history, and pertinent labs/EKGs/Imaging reviewed by BONIFACIO Sanchez.     Medical Decision Making  Medical Decision Making  At time of discharge patient was clinically well-appearing and HDS for outpatient management. The patient and/or family was educated regarding diagnosis, supportive care, OTC and Rx medications. The patient and/or family was given the opportunity to ask questions prior to discharge.  They verbalized understanding of my discussion of the plans for treatment, expected course, indications to return to  or seek further evaluation in ED, and the need for timely follow up as directed.   They were provided with a work/school excuse if requested.        Orders and Diagnoses  Diagnoses and all orders for this visit:  Influenza A  -     oseltamivir (Tamiflu) 75 mg capsule; Take 1 capsule (75 mg) by mouth 2 times a day for 5 days.  Fever, unspecified fever cause  -     POCT Influenza A/B manually resulted  Body aches  -     POCT Influenza A/B manually resulted  Acute cough  -     predniSONE (Deltasone) 20 mg tablet; Take 1 tablet (20 mg) by mouth 2 times a day for 5 days.  Bronchitis  -     azithromycin (Zithromax) 250 mg tablet; Take 2 tabs (500 mg) by mouth today, than 1 daily for 4 days.  -     albuterol (Proventil HFA) 90 mcg/actuation inhaler; Inhale 2 puffs every 4 hours if needed for wheezing or shortness of breath.  -     predniSONE (Deltasone) 20 mg tablet; Take 1 tablet (20 mg) by mouth 2 times a day for 5 days.  Wheezing  -     albuterol (Proventil HFA) 90 mcg/actuation inhaler; Inhale 2 puffs every 4 hours if needed for wheezing or shortness of breath.  -     predniSONE (Deltasone) 20 mg tablet; Take 1 tablet (20 mg) by mouth 2 times a day for 5 days.        Return to Urgent care if symptoms return or progress  Follow up with PCP in 1-2 weeks     Patient disposition: Home    Electronically  signed by Erika Valdivia, APRN-CNP  4:15 PM

## 2025-01-27 NOTE — LETTER
January 27, 2025     Patient: Bairon Quigley   YOB: 1965   Date of Visit: 1/27/2025       To Whom It May Concern:    Bairon Quigley was seen in my clinic on 1/27/2025 at 3:45 pm. Please excuse Bairon for his absence from work on this day to make the appointment. He may return 1/29/2025.    If you have any questions or concerns, please don't hesitate to call.         Sincerely,         Erika Valdivia, APRN-CNP        CC: No Recipients

## 2025-03-24 DIAGNOSIS — I15.8 OTHER SECONDARY HYPERTENSION: ICD-10-CM

## 2025-03-24 DIAGNOSIS — E78.00 HYPERCHOLESTEROLEMIA: ICD-10-CM

## 2025-03-24 DIAGNOSIS — I10 BENIGN HYPERTENSION: ICD-10-CM

## 2025-03-24 DIAGNOSIS — E87.6 HYPOKALEMIA: ICD-10-CM

## 2025-03-24 DIAGNOSIS — K21.9 GASTROESOPHAGEAL REFLUX DISEASE WITHOUT ESOPHAGITIS: ICD-10-CM

## 2025-03-25 RX ORDER — ESOMEPRAZOLE MAGNESIUM 40 MG/1
40 CAPSULE, DELAYED RELEASE ORAL DAILY
Qty: 90 CAPSULE | Refills: 1 | Status: SHIPPED | OUTPATIENT
Start: 2025-03-25

## 2025-03-25 RX ORDER — CARVEDILOL 25 MG/1
25 TABLET ORAL
Qty: 180 TABLET | Refills: 1 | Status: SHIPPED | OUTPATIENT
Start: 2025-03-25

## 2025-03-25 RX ORDER — DILTIAZEM HYDROCHLORIDE 420 MG/1
420 CAPSULE, EXTENDED RELEASE ORAL DAILY
Qty: 90 CAPSULE | Refills: 1 | Status: SHIPPED | OUTPATIENT
Start: 2025-03-25

## 2025-03-25 RX ORDER — TRIAMTERENE/HYDROCHLOROTHIAZID 37.5-25 MG
1 TABLET ORAL DAILY
Qty: 90 TABLET | Refills: 1 | Status: SHIPPED | OUTPATIENT
Start: 2025-03-25

## 2025-03-25 RX ORDER — LOSARTAN POTASSIUM 100 MG/1
100 TABLET ORAL DAILY
Qty: 90 TABLET | Refills: 1 | Status: SHIPPED | OUTPATIENT
Start: 2025-03-25

## 2025-03-25 RX ORDER — POTASSIUM CHLORIDE 20 MEQ/1
20 TABLET, EXTENDED RELEASE ORAL 3 TIMES DAILY
Qty: 270 TABLET | Refills: 1 | Status: SHIPPED | OUTPATIENT
Start: 2025-03-25

## 2025-03-25 RX ORDER — DILTIAZEM HYDROCHLORIDE 120 MG/1
120 CAPSULE, EXTENDED RELEASE ORAL DAILY
Qty: 90 CAPSULE | Refills: 1 | Status: SHIPPED | OUTPATIENT
Start: 2025-03-25

## 2025-03-25 RX ORDER — ROSUVASTATIN CALCIUM 10 MG/1
10 TABLET, COATED ORAL DAILY
Qty: 90 TABLET | Refills: 1 | Status: SHIPPED | OUTPATIENT
Start: 2025-03-25

## 2025-04-30 ENCOUNTER — APPOINTMENT (OUTPATIENT)
Dept: PRIMARY CARE | Facility: CLINIC | Age: 60
End: 2025-04-30
Payer: COMMERCIAL

## 2025-04-30 VITALS
RESPIRATION RATE: 18 BRPM | DIASTOLIC BLOOD PRESSURE: 74 MMHG | SYSTOLIC BLOOD PRESSURE: 144 MMHG | BODY MASS INDEX: 33.18 KG/M2 | OXYGEN SATURATION: 95 % | HEIGHT: 69 IN | WEIGHT: 224 LBS | HEART RATE: 75 BPM

## 2025-04-30 DIAGNOSIS — E78.5 HYPERLIPIDEMIA, UNSPECIFIED HYPERLIPIDEMIA TYPE: ICD-10-CM

## 2025-04-30 DIAGNOSIS — I10 HYPERTENSION, UNSPECIFIED TYPE: ICD-10-CM

## 2025-04-30 DIAGNOSIS — K21.00 GASTROESOPHAGEAL REFLUX DISEASE WITH ESOPHAGITIS WITHOUT HEMORRHAGE: ICD-10-CM

## 2025-04-30 DIAGNOSIS — N40.0 BENIGN PROSTATIC HYPERPLASIA WITHOUT LOWER URINARY TRACT SYMPTOMS: ICD-10-CM

## 2025-04-30 DIAGNOSIS — E55.9 VITAMIN D DEFICIENCY: ICD-10-CM

## 2025-04-30 DIAGNOSIS — E87.6 HYPOKALEMIA: ICD-10-CM

## 2025-04-30 PROCEDURE — 3008F BODY MASS INDEX DOCD: CPT | Performed by: INTERNAL MEDICINE

## 2025-04-30 PROCEDURE — 3077F SYST BP >= 140 MM HG: CPT | Performed by: INTERNAL MEDICINE

## 2025-04-30 PROCEDURE — 99213 OFFICE O/P EST LOW 20 MIN: CPT | Performed by: INTERNAL MEDICINE

## 2025-04-30 PROCEDURE — 3078F DIAST BP <80 MM HG: CPT | Performed by: INTERNAL MEDICINE

## 2025-04-30 PROCEDURE — 1036F TOBACCO NON-USER: CPT | Performed by: INTERNAL MEDICINE

## 2025-04-30 RX ORDER — LANOLIN ALCOHOL/MO/W.PET/CERES
1000 CREAM (GRAM) TOPICAL DAILY
COMMUNITY

## 2025-04-30 RX ORDER — TERAZOSIN 2 MG/1
2 CAPSULE ORAL NIGHTLY
Qty: 90 CAPSULE | Refills: 1 | Status: SHIPPED | OUTPATIENT
Start: 2025-04-30 | End: 2025-10-27

## 2025-04-30 RX ORDER — VIT C/E/ZN/COPPR/LUTEIN/ZEAXAN 250MG-90MG
25 CAPSULE ORAL DAILY
COMMUNITY

## 2025-04-30 ASSESSMENT — ENCOUNTER SYMPTOMS
CARDIOVASCULAR NEGATIVE: 1
NEUROLOGICAL NEGATIVE: 1
RESPIRATORY NEGATIVE: 1
GASTROINTESTINAL NEGATIVE: 1
MUSCULOSKELETAL NEGATIVE: 1
CONSTITUTIONAL NEGATIVE: 1
PSYCHIATRIC NEGATIVE: 1

## 2025-04-30 ASSESSMENT — PAIN SCALES - GENERAL: PAINLEVEL_OUTOF10: 0-NO PAIN

## 2025-04-30 NOTE — PATIENT INSTRUCTIONS
It was great to see you in the office today! Here is what we discussed at your visit today:    Please continue to take your current medications     A prescription was sent in for Hytrin 2mg at bedtime which will help lower your BP and decrease urination at night    Schedule your colonoscopy with Dr. Monreal    Follow up in four months

## 2025-04-30 NOTE — PROGRESS NOTES
"Patient ID: Bairon Quigley is a 59 y.o. male with PMH remarkable for HTN, HLD, right VATS  who presents to the office today for follow up.    HEALTH MAINTENANCE: Follow Up  Last Office Visit: 12/11/24, changed losartan-hydrochlorothiazide to only losartan, advised to get BMP in two weeks after change, K+ on 01/17/25 was 3.9  Last Labs: 1/17/25 Vitamin d low- to take vitamin d 1000 units daily otc, and vitamin b12 1000 units daily otc.   PSA Screening (starting at age 55 till 69): 9/6/23 wnl  Colonoscopy (45-75 or age 40 with 1st degree relative dx colon ca): 7/19/2018 per Dr. Monreal, advised to repeat in 5 years , states he has been putting this off  Lung cancer screening (50-78 y/o x 20 pk yr for at least 20 yrs + current smoker OR quit in last 15 years, no CT w/I last year):     HPI He states that he takes his BP medications as prescribed, his BP is always elevated. He denies any depression. He denies any issues with urination. He denies any CP, SOB or cough. He states he works out/works every day. He states he has to wake up once nightly to urinate.     Social History[1]    Review of Systems   Constitutional: Negative.    HENT: Negative.     Respiratory: Negative.     Cardiovascular: Negative.    Gastrointestinal: Negative.    Genitourinary: Negative.    Musculoskeletal: Negative.    Skin: Negative.    Neurological: Negative.    Psychiatric/Behavioral: Negative.       Visit Vitals  Vitals:    04/30/25 1428 04/30/25 1500   BP: (!) 147/91 144/74   BP Location: Left arm    Patient Position: Sitting    Pulse: 75    Resp: 18    SpO2: 95%    Weight: 102 kg (224 lb)    Height: 1.753 m (5' 9\")       Smoking Status Never     Physical Exam  Vitals reviewed.   Constitutional:       Appearance: Normal appearance.   HENT:      Head: Normocephalic and atraumatic.   Cardiovascular:      Rate and Rhythm: Normal rate and regular rhythm.      Pulses: Normal pulses.      Heart sounds: Normal heart sounds.   Pulmonary:      Effort: " Pulmonary effort is normal.      Breath sounds: Normal breath sounds.   Abdominal:      General: Bowel sounds are normal.      Palpations: Abdomen is soft.   Musculoskeletal:         General: Normal range of motion.      Cervical back: Normal range of motion.   Skin:     General: Skin is warm and dry.   Neurological:      General: No focal deficit present.      Mental Status: He is alert and oriented to person, place, and time.   Psychiatric:         Mood and Affect: Mood normal.         Behavior: Behavior normal.       Current Outpatient Medications   Medication Instructions    albuterol (Proventil HFA) 90 mcg/actuation inhaler 2 puffs, inhalation, Every 4 hours PRN    carvedilol (COREG) 25 mg, oral, 2 times daily (morning and late afternoon)    dilTIAZem ER (TIAZAC) 120 mg, oral, Daily, Ok to take with 420mg daily to equal max dose of 540mg daily    dilTIAZem ER (TIAZAC) 420 mg, oral, Daily    esomeprazole (NEXIUM) 40 mg, oral, Daily    losartan (COZAAR) 100 mg, oral, Daily    potassium chloride CR 20 mEq ER tablet 20 mEq, oral, 3 times daily    rosuvastatin (CRESTOR) 10 mg, oral, Daily    triamterene-hydrochlorothiazid (Maxzide-25) 37.5-25 mg tablet 1 tablet, oral, Daily      Lab Results   Component Value Date    WBC 7.9 01/17/2025    HGB 15.3 01/17/2025    HCT 45.2 01/17/2025     01/17/2025    CHOL 164 01/17/2025    TRIG 149 01/17/2025    HDL 54.7 01/17/2025    ALT 24 01/17/2025    AST 28 01/17/2025     (H) 01/17/2025    K 3.9 01/17/2025     (H) 01/17/2025    CREATININE 1.26 01/17/2025    BUN 15 01/17/2025    CO2 28 01/17/2025    TSH 0.66 01/17/2025    INR 1.1 11/19/2021    HGBA1C 5.5 06/12/2020       Problem List Items Addressed This Visit           ICD-10-CM    Hypertension I10    BP slightly elevated in office today and he states it is elevated at home at times  Reviewed medications, he is on max dose of all medications  He does wake up every night to urinate  Will start on Hytrin 2mg at  bedtime, discussed possible side effects of medication  Continue with Triamterene-hydrochlorothiazide, Losartan, Coreg, Cardizem           Esophageal reflux K21.9    Continue with PPI         Hyperlipidemia E78.5    Continue  with rosuvastatin         Hypokalemia E87.6    Most recent CMP from 01/17/25 with K+ 3.9  Continue with Potassium supplement         Vitamin D deficiency E55.9    Continue with vitamin d supplement          Other Visit Diagnoses         Codes      Benign prostatic hyperplasia without lower urinary tract symptoms     N40.0    Relevant Medications    terazosin (Hytrin) 2 mg capsule            --------------------  Written by Krista Lyon RN, acting as a scribe for Dr. Henry. This note accurately reflects the work and decisions made by Dr. Henry.     I, Dr. Henry, attest all medical record entries made by the scribe were under my direction and were personally dictated by me. I have reviewed the chart and agree that the record accurately reflects my performance of the history, physical exam, and assessment and plan.          [1]   Social History  Tobacco Use    Smoking status: Never    Smokeless tobacco: Former   Substance Use Topics    Alcohol use: Never    Drug use: Never

## 2025-04-30 NOTE — ASSESSMENT & PLAN NOTE
BP slightly elevated in office today and he states it is elevated at home at times  Reviewed medications, he is on max dose of all medications  He does wake up every night to urinate  Will start on Hytrin 2mg at bedtime, discussed possible side effects of medication  Continue with Triamterene-hydrochlorothiazide, Losartan, Coreg, Cardizem

## 2025-06-18 DIAGNOSIS — E78.00 HYPERCHOLESTEROLEMIA: ICD-10-CM

## 2025-06-18 DIAGNOSIS — I10 BENIGN HYPERTENSION: ICD-10-CM

## 2025-06-18 DIAGNOSIS — I15.8 OTHER SECONDARY HYPERTENSION: ICD-10-CM

## 2025-06-18 DIAGNOSIS — N40.0 BENIGN PROSTATIC HYPERPLASIA WITHOUT LOWER URINARY TRACT SYMPTOMS: ICD-10-CM

## 2025-06-18 DIAGNOSIS — E87.6 HYPOKALEMIA: ICD-10-CM

## 2025-06-18 DIAGNOSIS — K21.9 GASTROESOPHAGEAL REFLUX DISEASE WITHOUT ESOPHAGITIS: ICD-10-CM

## 2025-06-18 RX ORDER — ROSUVASTATIN CALCIUM 10 MG/1
10 TABLET, COATED ORAL DAILY
Qty: 90 TABLET | Refills: 1 | Status: SHIPPED | OUTPATIENT
Start: 2025-06-18

## 2025-06-18 RX ORDER — TERAZOSIN 2 MG/1
2 CAPSULE ORAL NIGHTLY
Qty: 90 CAPSULE | Refills: 1 | Status: SHIPPED | OUTPATIENT
Start: 2025-06-18 | End: 2025-12-15

## 2025-06-18 RX ORDER — TRIAMTERENE AND HYDROCHLOROTHIAZIDE 37.5; 25 MG/1; MG/1
1 TABLET ORAL DAILY
Qty: 90 TABLET | Refills: 1 | Status: SHIPPED | OUTPATIENT
Start: 2025-06-18

## 2025-06-18 RX ORDER — LOSARTAN POTASSIUM 100 MG/1
100 TABLET ORAL DAILY
Qty: 90 TABLET | Refills: 1 | Status: SHIPPED | OUTPATIENT
Start: 2025-06-18

## 2025-06-18 RX ORDER — CARVEDILOL 25 MG/1
25 TABLET ORAL
Qty: 180 TABLET | Refills: 1 | Status: SHIPPED | OUTPATIENT
Start: 2025-06-18

## 2025-06-18 RX ORDER — DILTIAZEM HYDROCHLORIDE 120 MG/1
120 CAPSULE, EXTENDED RELEASE ORAL DAILY
Qty: 90 CAPSULE | Refills: 1 | Status: SHIPPED | OUTPATIENT
Start: 2025-06-18

## 2025-06-18 RX ORDER — ESOMEPRAZOLE MAGNESIUM 40 MG/1
40 CAPSULE, DELAYED RELEASE ORAL DAILY
Qty: 90 CAPSULE | Refills: 1 | Status: SHIPPED | OUTPATIENT
Start: 2025-06-18

## 2025-06-18 RX ORDER — DILTIAZEM HYDROCHLORIDE 420 MG/1
420 CAPSULE, EXTENDED RELEASE ORAL DAILY
Qty: 90 CAPSULE | Refills: 1 | Status: SHIPPED | OUTPATIENT
Start: 2025-06-18

## 2025-06-18 RX ORDER — POTASSIUM CHLORIDE 20 MEQ/1
20 TABLET, EXTENDED RELEASE ORAL 3 TIMES DAILY
Qty: 270 TABLET | Refills: 1 | Status: SHIPPED | OUTPATIENT
Start: 2025-06-18

## 2025-07-24 ENCOUNTER — OFFICE VISIT (OUTPATIENT)
Dept: URGENT CARE | Age: 60
End: 2025-07-24
Payer: COMMERCIAL

## 2025-07-24 VITALS
DIASTOLIC BLOOD PRESSURE: 66 MMHG | RESPIRATION RATE: 20 BRPM | TEMPERATURE: 98.9 F | WEIGHT: 224.87 LBS | HEART RATE: 61 BPM | OXYGEN SATURATION: 98 % | SYSTOLIC BLOOD PRESSURE: 119 MMHG | BODY MASS INDEX: 33.21 KG/M2

## 2025-07-24 DIAGNOSIS — J02.9 SORE THROAT: ICD-10-CM

## 2025-07-24 DIAGNOSIS — J01.90 ACUTE NON-RECURRENT SINUSITIS, UNSPECIFIED LOCATION: Primary | ICD-10-CM

## 2025-07-24 LAB
POC HUMAN RHINOVIRUS PCR: NEGATIVE
POC INFLUENZA A VIRUS PCR: NEGATIVE
POC INFLUENZA B VIRUS PCR: NEGATIVE
POC RAPID MONO: NEGATIVE
POC RESPIRATORY SYNCYTIAL VIRUS PCR: NEGATIVE
POC STREPTOCOCCUS PYOGENES (GROUP A STREP) PCR: NEGATIVE

## 2025-07-24 RX ORDER — CETIRIZINE HYDROCHLORIDE 10 MG/1
10 TABLET ORAL DAILY
Qty: 30 TABLET | Refills: 0 | Status: SHIPPED | OUTPATIENT
Start: 2025-07-24 | End: 2026-07-24

## 2025-07-24 RX ORDER — AMOXICILLIN AND CLAVULANATE POTASSIUM 875; 125 MG/1; MG/1
875 TABLET, FILM COATED ORAL 2 TIMES DAILY
Qty: 20 TABLET | Refills: 0 | Status: SHIPPED | OUTPATIENT
Start: 2025-07-24 | End: 2025-08-03

## 2025-07-24 RX ORDER — FLUTICASONE PROPIONATE 50 MCG
1 SPRAY, SUSPENSION (ML) NASAL DAILY
Qty: 16 G | Refills: 0 | Status: SHIPPED | OUTPATIENT
Start: 2025-07-24 | End: 2025-08-07

## 2025-07-24 RX ADMIN — Medication 10 MG: at 17:11

## 2025-07-24 NOTE — PROGRESS NOTES
Subjective   Patient ID: Bairon Quigley is a 60 y.o. male. They present today with a chief complaint of Illness (Sore throat, difficulty swallowing, hoarseness x5 days).    History of Present Illness  60-year-old male presents urgent care companied by wife for sore throat trouble swallowing due to pain as well as voice hoarseness for the past 5 days.  Also has postnasal drip and fatigue.  Also has nonproductive cough.  Denies current fevers or chills, nausea or vomiting or sweats, chest pain or shortness of breath, abdominal pain.  Denies history of bronchitis or pneumonia.  States he does have history of sinus infections states this feels similar.  Strep spot fire is negative.  Mono is negative.  Patient offered chest x-ray and patient declines.  Educated if cough persists or does not improve in 48 to 72 hours to please return for chest x-ray and reassessment.  Gave 10 mg Decadron IM formulation as oral for sore throat.  Prescribed Augmentin, Flonase and Zyrtec for sinusitis.  Educated on supportive care.  Return/ER precautions.  Follow-up PCP.  Patient and patient's wife agree with plan.      Illness      Past Medical History  Allergies as of 07/24/2025 - Reviewed 07/24/2025   Allergen Reaction Noted    Influenza virus vaccine whole Unknown 03/21/2023       Prescriptions Prior to Admission[1]     Medical History[2]    Surgical History[3]     reports that he has never smoked. He has quit using smokeless tobacco. He reports that he does not drink alcohol and does not use drugs.    Review of Systems  Review of Systems   All other systems reviewed and are negative.                                 Objective    Vitals:    07/24/25 1625   BP: 119/66   Pulse: 61   Resp: 20   Temp: 37.2 °C (98.9 °F)   TempSrc: Oral   SpO2: 98%   Weight: 102 kg (224 lb 13.9 oz)     No LMP for male patient.    Physical Exam  Vitals reviewed.   Constitutional:       General: He is not in acute distress.     Appearance: Normal appearance. He  is not ill-appearing, toxic-appearing or diaphoretic.   HENT:      Head: Normocephalic and atraumatic.      Comments: Sinus congestion     Nose: Congestion present.      Mouth/Throat:      Mouth: Mucous membranes are moist.      Comments: Thick yellow postnasal drip posterior pharynx.  Pharynx and tonsils erythematous without exudate.  Uvula midline.  Airway otherwise clear.    Cardiovascular:      Rate and Rhythm: Normal rate and regular rhythm.   Pulmonary:      Effort: Pulmonary effort is normal. No respiratory distress.      Breath sounds: Normal breath sounds. No stridor. No wheezing, rhonchi or rales.   Abdominal:      General: Abdomen is flat.      Palpations: Abdomen is soft.      Tenderness: There is no abdominal tenderness. There is no right CVA tenderness or left CVA tenderness.     Musculoskeletal:      Cervical back: Normal range of motion and neck supple. Tenderness present. No rigidity.   Lymphadenopathy:      Cervical: Cervical adenopathy present.     Skin:     General: Skin is warm and dry.     Neurological:      General: No focal deficit present.      Mental Status: He is alert and oriented to person, place, and time.     Psychiatric:         Mood and Affect: Mood normal.         Behavior: Behavior normal.         Procedures    Point of Care Test & Imaging Results from this visit  Results for orders placed or performed in visit on 07/24/25   POCT SPOTFIRE R/ST Panel Mini w/Strep A ([facility]) manually resulted   Result Value Ref Range    POC Group A Strep, PCR Negative Negative    POC Respiratory Syncytial Virus PCR Negative Negative    POC Influenza A Virus PCR Negative Negative    POC Influenza B Virus PCR Negative Negative    POC Human Rhinovirus PCR Negative Negative      Imaging  No results found.    Cardiology, Vascular, and Other Imaging  No other imaging results found for the past 2 days      Diagnostic study results (if any) were reviewed by Zully Sánchez PA-C.    Assessment/Plan    Allergies, medications, history, and pertinent labs/EKGs/Imaging reviewed by Zully Sánchez PA-C.     Medical Decision Making  60-year-old male presents urgent care companied by wife for sore throat trouble swallowing due to pain as well as voice hoarseness for the past 5 days.  Also has postnasal drip and fatigue.  Also has nonproductive cough.  Denies current fevers or chills, nausea or vomiting or sweats, chest pain or shortness of breath, abdominal pain.  Denies history of bronchitis or pneumonia.  States he does have history of sinus infections states this feels similar.  Strep spot fire is negative.  Mono is negative.  Patient offered chest x-ray and patient declines.  Educated if cough persists or does not improve in 48 to 72 hours to please return for chest x-ray and reassessment.  Gave 10 mg Decadron IM formulation as oral for sore throat.  Prescribed Augmentin, Flonase and Zyrtec for sinusitis.  Educated on supportive care.  Return/ER precautions.  Follow-up PCP.  Patient and patient's wife agree with plan.    Orders and Diagnoses  Diagnoses and all orders for this visit:  Sore throat  -     POCT SPOTFIRE R/ST Panel Mini w/Strep A ([facility]) manually resulted      Medical Admin Record      Patient disposition: Home    Electronically signed by Zully Sánchez PA-C  4:55 PM           [1] (Not in a hospital admission)  [2]   Past Medical History:  Diagnosis Date    Abnormal EKG 03/21/2023    History of falling 11/12/2021    History of fall    Personal history of other diseases of the circulatory system     History of hypertension    Personal history of other endocrine, nutritional and metabolic disease     History of hypokalemia    Personal history of other endocrine, nutritional and metabolic disease     History of hypercholesterolemia    Pleurodynia 11/12/2021    Rib pain    Rib fractures 03/21/2023   [3]   Past Surgical History:  Procedure Laterality Date    CHEST TUBE INSERTION      RIB  FRACTURE SURGERY

## 2025-07-24 NOTE — PATIENT INSTRUCTIONS
Take medications as prescribed.  Maintain adequate hydration and nutrition.  If symptoms worsen, do not improve, or any other concerning or worrisome symptoms develop please return to the clinic or go to the emergency department.  Follow-up with PCP in 1 to 2 weeks.  If cough persists or worsens in 48 to 72 hours please return for chest x-ray and reassessment.

## 2025-07-25 ENCOUNTER — TELEPHONE (OUTPATIENT)
Dept: URGENT CARE | Age: 60
End: 2025-07-25